# Patient Record
Sex: FEMALE | Race: WHITE | Employment: OTHER | URBAN - METROPOLITAN AREA
[De-identification: names, ages, dates, MRNs, and addresses within clinical notes are randomized per-mention and may not be internally consistent; named-entity substitution may affect disease eponyms.]

---

## 2017-02-15 ENCOUNTER — HOSPITAL ENCOUNTER (OUTPATIENT)
Dept: GENERAL RADIOLOGY | Age: 82
Discharge: HOME | End: 2017-02-15
Payer: MEDICARE

## 2017-02-15 ENCOUNTER — OFFICE VISIT (OUTPATIENT)
Dept: INTERNAL MEDICINE CLINIC | Age: 82
End: 2017-02-15

## 2017-02-15 VITALS
SYSTOLIC BLOOD PRESSURE: 138 MMHG | RESPIRATION RATE: 14 BRPM | BODY MASS INDEX: 24.96 KG/M2 | WEIGHT: 132.2 LBS | OXYGEN SATURATION: 97 % | DIASTOLIC BLOOD PRESSURE: 78 MMHG | TEMPERATURE: 99.2 F | HEIGHT: 61 IN | HEART RATE: 99 BPM

## 2017-02-15 DIAGNOSIS — R07.81 RIB PAIN ON RIGHT SIDE: ICD-10-CM

## 2017-02-15 DIAGNOSIS — R07.9 CHEST PAIN, UNSPECIFIED TYPE: ICD-10-CM

## 2017-02-15 DIAGNOSIS — R06.02 SOB (SHORTNESS OF BREATH) ON EXERTION: ICD-10-CM

## 2017-02-15 DIAGNOSIS — R07.81 RIB PAIN ON RIGHT SIDE: Primary | ICD-10-CM

## 2017-02-15 PROCEDURE — 71111 X-RAY EXAM RIBS/CHEST4/> VWS: CPT

## 2017-02-15 RX ORDER — ACETAMINOPHEN 500 MG
500 TABLET ORAL
Qty: 1 TAB | Refills: 0 | COMMUNITY
Start: 2017-02-15 | End: 2018-10-03

## 2017-02-15 NOTE — MR AVS SNAPSHOT
Visit Information Date & Time Provider Department Dept. Phone Encounter #  
 2/15/2017  2:00 PM Mikle Marking, [address] [phone] [id number] Follow-up Instructions Return for f/u in April as scheduled with Dr. Yuridia Lucas. Your Appointments 4/5/2017 12:40 PM  
ROUTINE CARE with MD Ludin [facility] ([address]) Appt Note: 6m fu for HTN  
 [address], [address] [place] [address]  
[address], [facility] [address] [phone] Upcoming Health Maintenance Date Due DTaP/Tdap/Td series (1 - Tdap) 12/18/1946 ZOSTER VACCINE AGE 60> 12/18/1985 INFLUENZA AGE 9 TO ADULT 8/1/2016 MEDICARE YEARLY EXAM 9/22/2016 GLAUCOMA SCREENING Q2Y 4/25/2018 Allergies as of 2/15/2017  Review Complete On: 2/15/2017 By: Americo Diamond LPN Severity Noted Reaction Type Reactions Morphine  11/09/2011    Nausea and Vomiting Did not relieve pain and couldn't sleep and caused vomiting Cortisone Low 12/31/2013   Side Effect Other (comments) Injection-she had trouble walking after injection - worsening of movement Current Immunizations  Reviewed on 9/22/2015 Name Date Influenza Vaccine 10/15/2014 Pneumococcal Conjugate (PCV-13) 9/22/2015 Pneumococcal Polysaccharide (PPSV-23) 6/17/2013 Not reviewed this visit You Were Diagnosed With   
  
 Codes Comments Rib pain on right side    -  Primary ICD-10-CM: R07.81 ICD-9-CM: 786.50 SOB (shortness of breath) on exertion     ICD-10-CM: R06.02 
ICD-9-CM: 786.05 Chest pain, unspecified type     ICD-10-CM: R07.9 ICD-9-CM: 786.50 Vitals BP Pulse Temp Resp Height(growth percentile) Weight(growth percentile) 138/78 (BP 1 Location: Left arm, BP Patient Position: Sitting) 99 99.2 °F (37.3 °C) (Oral) 14 5' 1\" (1.549 m) 132 lb 3.2 oz (60 kg) SpO2 BMI OB Status Smoking Status 97% 24.98 kg/m2 Hysterectomy Never Smoker Vitals History BMI and BSA Data Body Mass Index Body Surface Area 24.98 kg/m 2 1.61 m 2 Preferred Pharmacy Pharmacy Name Phone [facility], [address] [phone] Your Updated Medication List  
  
   
This list is accurate as of: 2/15/17  2:47 PM.  Always use your most recent med list.  
  
  
  
  
 levothyroxine 75 mcg tablet Commonly known as:  SYNTHROID Take 1 tablet 6 days per week. losartan 25 mg tablet Commonly known as:  COZAAR Take 1 Tab by mouth daily. Indications: HYPERTENSION  
  
 nabumetone 500 mg tablet Commonly known as:  RELAFEN Take 1 Tab by mouth two (2) times daily as needed for Pain. PRESERVISION AREDS 2 PO Take  by mouth. SYSTANE (PROPYLENE GLYCOL) 0.4-0.3 % Drop Generic drug:  peg 400-propylene glycol Administer  to left eye as needed. TYLENOL EXTRA STRENGTH 500 mg tablet Generic drug:  acetaminophen Take 1 Tab by mouth every eight (8) hours as needed for Pain. VITAMIN D3 1,000 unit tablet Generic drug:  cholecalciferol Take 2,000 Units by mouth daily. We Performed the Following AMB POC EKG ROUTINE W/ 12 LEADS, INTER & REP [91634 CPT(R)] Follow-up Instructions Return for f/u in April as scheduled with Dr. Ritchie Landrum. To-Do List   
 02/15/2017 Imaging:  XR RIBS BI W PA CHEST 4 VS   
  
  
Introducing [employer]! Thony Thomson introduces [employer] patient portal. Now you can access parts of your medical record, email your doctor's office, and request medication refills online. 1. In your internet browser, go to https://[email or web address]. Cmed/Ask The Doctort 2. Click on the First Time User? Click Here link in the Sign In box. You will see the New Member Sign Up page. 3. Enter your [employer] Access Code exactly as it appears below.  You will not need to use this code after youve completed the sign-up process. If you do not sign up before the expiration date, you must request a new code. · [employer] Access Code: [email or web address] Expires: 5/16/2017  1:37 PM 
 
4. Enter the last four digits of your Social Security Number (xxxx) and Date of Birth (mm/dd/yyyy) as indicated and click Submit. You will be taken to the next sign-up page. 5. Create a [employer] ID. This will be your [employer] login ID and cannot be changed, so think of one that is secure and easy to remember. 6. Create a [employer] password. You can change your password at any time. 7. Enter your Password Reset Question and Answer. This can be used at a later time if you forget your password. 8. Enter your e-mail address. You will receive e-mail notification when new information is available in [address]. 9. Click Sign Up. You can now view and download portions of your medical record. 10. Click the Download Summary menu link to download a portable copy of your medical information. If you have questions, please visit the Frequently Asked Questions section of the [employer] website. Remember, [employer] is NOT to be used for urgent needs. For medical emergencies, dial 911. Now available from your iPhone and Android! Please provide this summary of care documentation to your next provider. Your primary care clinician is listed as [address]. If you have any questions after today's visit, please call [phone].

## 2017-02-15 NOTE — PROGRESS NOTES
Paige Moore is a 80 y.o. female  Chief Complaint   Patient presents with    Fall     2 weeks ago, hit right side/ribs on upholster arm of chair     1. Have you been to the ER, urgent care clinic since your last visit? Hospitalized since your last visit? No    2. Have you seen or consulted any other health care providers outside of the [address] [place] [address] since your last visit? Include any pap smears or colon screening.   No

## 2017-02-15 NOTE — PROGRESS NOTES
Subjective:      Jenness Ormond is a 80 y.o. female who presents today for rib pain. R chest wall pain (anterior, lateral and posterior) started after she hit tripped and fell onto an upholstered chair. She did not hit her head. Pain worse with coughing, movement and with deep breathing. Pain is sharp. No bruising or skin changes. No wheezing. She has been taking four 81 mg ASA daily since her fall. Takes Nabumetone once a week for knee arthritis pain. She notes feeling sob on exertion intermittently over the last year (ie doing house work, vacuuming, etc). Occasional mid chest pain with and without exertion x 1 year also. She attributes sob to [facility] age\" and chest pain to \"indigestion. \" Intermittent reflux and \"sour taste\" in throat. Sob and chest pain have no impact on ADLs. Current Outpatient Prescriptions   Medication Sig Dispense Refill    VIT C/E/ZN/COPPR/LUTEIN/ZEAXAN (PRESERVISION AREDS 2 PO) Take  by mouth.  peg 400-propylene glycol (SYSTANE, PROPYLENE GLYCOL,) 0.4-0.3 % drop Administer  to left eye as needed.  acetaminophen (TYLENOL EXTRA STRENGTH) 500 mg tablet Take 1 Tab by mouth every eight (8) hours as needed for Pain. 1 Tab 0    nabumetone (RELAFEN) 500 mg tablet Take 1 Tab by mouth two (2) times daily as needed for Pain. 60 Tab 0    cholecalciferol (VITAMIN D3) 1,000 unit tablet Take 2,000 Units by mouth daily.  levothyroxine (SYNTHROID) 75 mcg tablet Take 1 tablet 6 days per week. 90 Tab 1    losartan (COZAAR) 25 mg tablet Take 1 Tab by mouth daily.  Indications: HYPERTENSION 90 Tab 3     Allergies   Allergen Reactions    Morphine Nausea and Vomiting     Did not relieve pain and couldn't sleep and caused vomiting      Cortisone Other (comments)     Injection-she had trouble walking after injection - worsening of movement     Past Medical History   Diagnosis Date    Hypercholesterolemia     Hypertension     Thyroid disease      hypothyroid        Review of Systems    Pertinent items are noted in HPI. Objective:     Visit Vitals    /78 (BP 1 Location: Left arm, BP Patient Position: Sitting)    Pulse 99    Temp 99.2 °F (37.3 °C) (Oral)    Resp 14    Ht 5' 1\" (1.549 m)    Wt 132 lb 3.2 oz (60 kg)    SpO2 97%    BMI 24.98 kg/m2     General appearance: alert, cooperative, no distress, appears stated age, very pleasant white elderly female  Lungs: clear to auscultation bilaterally  Heart: regular rate and rhythm, S1, S2 normal, no murmur, click, rub or gallop  Chest wall: R lower anterior, lateral and posterior rib pain  Neuro: A&Ox3, CN II-XII intact. Gait and coordination normal.    Assessment/Plan:   Rib pain on right side - check rib series to r/o fx. Tylenol 500 mg or 650 mg q8h prn. Avoid nsaids. Cold/warm compresses. Realistic expectations. -     XR RIBS BI W PA CHEST 4 VS; Future    SOB (shortness of breath) on exertion - unchanged x 1 year. Check CXR. Reassured. -     XR RIBS BI W PA CHEST 4 VS; Future  -     AMB POC EKG ROUTINE W/ 12 LEADS, INTER & REP    Chest pain, unspecified type - atypical. EKG wnl. ?GERD. D/c ASA. -     XR RIBS BI W PA CHEST 4 VS; Future  -     AMB POC EKG ROUTINE W/ 12 LEADS, INTER & REP    Discussed plan with Dr. Herminio Lynn.    Follow-up Disposition:  Return for f/u in April as scheduled with Dr. Nahomy Duran. Advised her to call back or return to office if symptoms worsen/change/persist.  Discussed expected course/resolution/complications of diagnosis in detail with patient. Medication risks/benefits/costs/interactions/alternatives discussed with patient. She was given an after visit summary which includes diagnoses, current medications, & vitals. She expressed understanding with the diagnosis and plan.     Vinod Go PA-C

## 2017-03-02 RX ORDER — LEVOTHYROXINE SODIUM 75 UG/1
TABLET ORAL
Qty: 90 TAB | Refills: 1 | Status: SHIPPED | OUTPATIENT
Start: 2017-03-02 | End: 2017-10-16 | Stop reason: SDUPTHER

## 2017-03-02 NOTE — TELEPHONE ENCOUNTER
Last office visit - 02.15.17  Next office visit - 04.05.17      Requested Prescriptions     Pending Prescriptions Disp Refills    levothyroxine (SYNTHROID) 75 mcg tablet 90 Tab 1     Sig: Take 1 tablet 6 days per week.

## 2017-03-21 DIAGNOSIS — I10 BENIGN ESSENTIAL HYPERTENSION: ICD-10-CM

## 2017-03-21 RX ORDER — LOSARTAN POTASSIUM 25 MG/1
TABLET ORAL
Qty: 90 TAB | Refills: 0 | Status: SHIPPED | OUTPATIENT
Start: 2017-03-21 | End: 2017-06-15 | Stop reason: SDUPTHER

## 2017-04-05 ENCOUNTER — HOSPITAL ENCOUNTER (OUTPATIENT)
Dept: LAB | Age: 82
Discharge: HOME | End: 2017-04-05
Payer: MEDICARE

## 2017-04-05 ENCOUNTER — OFFICE VISIT (OUTPATIENT)
Dept: INTERNAL MEDICINE CLINIC | Age: 82
End: 2017-04-05

## 2017-04-05 VITALS
BODY MASS INDEX: 25.68 KG/M2 | WEIGHT: 136 LBS | DIASTOLIC BLOOD PRESSURE: 70 MMHG | OXYGEN SATURATION: 98 % | HEIGHT: 61 IN | TEMPERATURE: 98.4 F | SYSTOLIC BLOOD PRESSURE: 110 MMHG | HEART RATE: 97 BPM | RESPIRATION RATE: 18 BRPM

## 2017-04-05 DIAGNOSIS — E55.9 VITAMIN D DEFICIENCY: ICD-10-CM

## 2017-04-05 DIAGNOSIS — M17.0 ARTHRITIS OF BOTH KNEES: Primary | ICD-10-CM

## 2017-04-05 DIAGNOSIS — E03.9 ACQUIRED HYPOTHYROIDISM: ICD-10-CM

## 2017-04-05 DIAGNOSIS — I10 BENIGN ESSENTIAL HYPERTENSION: ICD-10-CM

## 2017-04-05 DIAGNOSIS — Z00.00 MEDICARE ANNUAL WELLNESS VISIT, SUBSEQUENT: ICD-10-CM

## 2017-04-05 PROCEDURE — 36415 COLL VENOUS BLD VENIPUNCTURE: CPT

## 2017-04-05 PROCEDURE — 80048 BASIC METABOLIC PNL TOTAL CA: CPT

## 2017-04-05 PROCEDURE — 85027 COMPLETE CBC AUTOMATED: CPT

## 2017-04-05 PROCEDURE — 82306 VITAMIN D 25 HYDROXY: CPT

## 2017-04-05 PROCEDURE — 84443 ASSAY THYROID STIM HORMONE: CPT

## 2017-04-05 NOTE — PROGRESS NOTES
HPI:  Harry Main is a 80y.o. year old female who returns to clinic today for routine follow up appointment to discuss the issues below:    Lives independently at [facility]. She feels generally well. 1.  Arthritis in both knees, right > left and in hands. Takes nabumetone on a prn basis only (and tends to wait until pain is severe). 2.  HTN. Adherent to current regimen as prescribed. No issues. 3.  Hypothyrodism. Adherent to levothyroxine 6 days per week on empty stomach. Last TSH wnl 10/2016.    4.  Vit D - inc to 2000 units after low level in Oct.     Prior to Admission medications    Medication Sig Start Date End Date Taking? Authorizing Provider   losartan (COZAAR) 25 mg tablet TAKE ONE TABLET EVERY DAY 3/21/17  Yes Trisha Weiner MD   levothyroxine (SYNTHROID) 75 mcg tablet Take 1 tablet 6 days per week. 3/2/17  Yes Trisha Weiner MD   VIT C/E/ZN/COPPR/LUTEIN/ZEAXAN (PRESERVISION AREDS 2 PO) Take  by mouth. Yes Historical Provider   peg 400-propylene glycol (SYSTANE, PROPYLENE GLYCOL,) 0.4-0.3 % drop Administer  to left eye as needed. Yes Historical Provider   acetaminophen (TYLENOL EXTRA STRENGTH) 500 mg tablet Take 1 Tab by mouth every eight (8) hours as needed for Pain. 2/15/17  Yes GABBY Salas   nabumetone (RELAFEN) 500 mg tablet Take 1 Tab by mouth two (2) times daily as needed for Pain. 11/10/16  Yes Trisha Weiner MD   cholecalciferol (VITAMIN D3) 1,000 unit tablet Take 2,000 Units by mouth daily. Yes Historical Provider          Allergies   Allergen Reactions    Morphine Nausea and Vomiting     Did not relieve pain and couldn't sleep and caused vomiting      Cortisone Other (comments)     Injection-she had trouble walking after injection - worsening of movement           Review of Systems   Constitutional: Negative for chills, fever and malaise/fatigue. HENT: Negative for congestion. Respiratory: Negative for cough, shortness of breath and wheezing. Cardiovascular: Negative for chest pain, palpitations and leg swelling. Gastrointestinal: Positive for heartburn (intermittently). Negative for abdominal pain and blood in stool. Musculoskeletal: Negative for falls, joint pain and myalgias. Neurological: Negative for dizziness and headaches. Physical Exam   Constitutional: She appears well-nourished. Neck: Carotid bruit is not present. Cardiovascular: Normal rate, regular rhythm and normal heart sounds. No murmur heard. Pulses:       Carotid pulses are 2+ on the right side, and 2+ on the left side. Pulmonary/Chest: Effort normal and breath sounds normal.   Abdominal: Soft. Bowel sounds are normal. There is no hepatosplenomegaly. There is no tenderness. Musculoskeletal: She exhibits no edema. Visit Vitals    /70 (BP 1 Location: Left arm, BP Patient Position: Sitting)    Pulse 97    Temp 98.4 °F (36.9 °C) (Oral)    Resp 18    Ht 5' 1\" (1.549 m)    Wt 136 lb (61.7 kg)    SpO2 98%    BMI 25.7 kg/m2         Assessment & Plan:  Katia Aburto was seen today for hypertension and annual wellness visit. Diagnoses and all orders for this visit:    Arthritis of both knees  Discussed recommend use of extra strength tylenol as first line therapy with topical lidocaine to knees. Use nabumetone as breakthrough agent. Benign essential hypertension  I evaluated and recommended to continue current doses of medications pending lab work. -     CBC W/O DIFF  -     METABOLIC PANEL, BASIC    Acquired hypothyroidism  Clinically euthyroid but HR upper end normal today. Recheck TSH.   -     TSH REFLEX TO T4    Vitamin D deficiency  -     VITAMIN D, 25 HYDROXY    Also, she has additional complaints of - seen for medicare wellness.    Reviewed documentation with Emeterio Boswell RN    Medicare annual wellness visit, subsequent      Follow-up Disposition:  Return in about 6 months (around 10/5/2017) for Establish care with new provider for Hypertension follow up. Advised her to call back or return to office if symptoms worsen/change/persist.  Discussed expected course/resolution/complications of diagnosis in detail with patient. Medication risks/benefits/costs/interactions/alternatives discussed with patient. She was given an after visit summary which includes diagnoses, current medications, & vitals. She expressed understanding with the diagnosis and plan.

## 2017-04-05 NOTE — PROGRESS NOTES
Chief Complaint   Patient presents with    Hypertension     1. Have you been to the ER, urgent care clinic since your last visit? No  Hospitalized since your last visit? No    2. Have you seen or consulted any other health care providers outside of the [address] [place] [address] since your last visit? No  Include any pap smears or colon screening.  No

## 2017-04-05 NOTE — PATIENT INSTRUCTIONS
Odor Free Aspercreme (topical Lidocaine)    Schedule of Personalized Health Plan  (Provide Copy to Patient)  The best way to stay healthy is to live a healthy lifestyle. A healthy lifestyle includes regular exercise, eating a well-balanced diet, keeping a healthy weight and not smoking. Regular physical exams and screening tests are another important way to take care of yourself. Preventive exams provided by health care providers can find health problems early when treatment works best and can keep you from getting certain diseases or illnesses. Preventive services include exams, lab tests, screenings, shots, monitoring and information to help you take care of your own health. All people over 65 should have a pneumonia shot. Pneumonia shots are usually only needed once in a lifetime unless your doctor decides differently. (received pneumovax 23 6/17/13; Prevnar 13 (9/22/15)    All people over 65 should have a yearly flu shot. (IVNA Oct 2016)    People over 65 are at medium to high risk for Hepatitis B. Three shots are needed for complete protection. In addition to your physical exam, some screening tests are recommended:    Bone mass measurement (dexa scan) is recommended every two years (4/21/16 completed)  Diabetes Mellitus screening is recommended every year. (ordered today)    Glaucoma is an eye disease caused by high pressure in the eye. An eye exam is recommended every year. [facility]; has appt scheduled)    Cardiovascular screening tests that check your cholesterol and other blood fat (lipid) levels are recommended every five years. Colorectal Cancer screening tests help to find pre-cancerous polyps (growths in the colon) so they can be removed before they turn into cancer. Tests ordered for screening depend on your personal and family history risk factors.      Screening for Breast Cancer is recommended yearly with a mammogram. ([place]; to schedule)    Screening for Cervical Cancer is recommended every two years (annually for certain risk factors, such as previous history of STD or abnormal PAP in past 7 years), with a Pelvic Exam with PAP (Dr. Conor Baldwin; will schedule)    Here is a list of your current Health Maintenance items with a due date:  Health Maintenance   Topic Date Due    DTaP/Tdap/Td series (1 - Tdap) 12/18/1946    ZOSTER VACCINE AGE 60>  12/18/1985    INFLUENZA AGE 9 TO ADULT  08/01/2016    MEDICARE YEARLY EXAM  04/06/2018    GLAUCOMA SCREENING Q2Y  04/25/2018    OSTEOPOROSIS SCREENING (DEXA)  Completed    Pneumococcal 65+ Low/Medium Risk  Completed

## 2017-04-05 NOTE — PROGRESS NOTES
Army Collier is a 80 y.o. female and presents for annual Medicare Wellness Visit. Problem List: Reviewed with patient and discussed risk factors. Patient Active Problem List   Diagnosis Code    Colon polyp K63.5    Elevated cholesterol E78.00    Benign essential hypertension I10    Acquired hypothyroidism E03.9    Cataract H26.9    Arthritis of both knees M17.0    TB (tuberculosis), treated A15.9    H/O bone density study Z92.89    Episodic lightheadedness R42    Vitamin D deficiency E55.9       Current medical providers:  Patient Care Team:  Lucia Iraheta MD as PCP - General (Internal Medicine)  Brent Lamb MD as Physician (Orthopedic Surgery)  Roseanne Feliciano MD as Physician (Ophthalmology)    [address] [place] [address] Yanely: Reviewed with patient  Past Surgical History:   Procedure Laterality Date    ENDOSCOPY, COLON, DIAGNOSTIC  2001    HX BACK SURGERY  1984    HX BREAST BIOPSY  2010    duct removal    HX DILATION AND CURETTAGE      HX HYSTERECTOMY  1980    +BSO    HX OPEN REDUCTION INTERNAL FIXATION  1996    L forearm    VASCULAR SURGERY PROCEDURE UNLIST  1960s    vein stripping-bilat        SH: Reviewed with patient  Social History   Substance Use Topics    Smoking status: Never Smoker    Smokeless tobacco: Never Used    Alcohol use No       FH: Reviewed with patient  Family History   Problem Relation Age of Onset    Cancer Father      liver    Colon Cancer Mother     Cancer Sister      liver    Cancer Brother      liver    Colon Polyps Sister     Thyroid Cancer Daughter        Medications/Allergies: Reviewed with patient  Current Outpatient Prescriptions on File Prior to Visit   Medication Sig Dispense Refill    losartan (COZAAR) 25 mg tablet TAKE ONE TABLET EVERY DAY 90 Tab 0    levothyroxine (SYNTHROID) 75 mcg tablet Take 1 tablet 6 days per week. 90 Tab 1    VIT C/E/ZN/COPPR/LUTEIN/ZEAXAN (PRESERVISION AREDS 2 PO) Take  by mouth.       peg 400-propylene glycol (SYSTANE, PROPYLENE GLYCOL,) 0.4-0.3 % drop Administer  to left eye as needed.  acetaminophen (TYLENOL EXTRA STRENGTH) 500 mg tablet Take 1 Tab by mouth every eight (8) hours as needed for Pain. 1 Tab 0    nabumetone (RELAFEN) 500 mg tablet Take 1 Tab by mouth two (2) times daily as needed for Pain. 60 Tab 0    cholecalciferol (VITAMIN D3) 1,000 unit tablet Take 2,000 Units by mouth daily. No current facility-administered medications on file prior to visit. Allergies   Allergen Reactions    Morphine Nausea and Vomiting     Did not relieve pain and couldn't sleep and caused vomiting      Cortisone Other (comments)     Injection-she had trouble walking after injection - worsening of movement       Objective:  Visit Vitals    /70 (BP 1 Location: Left arm, BP Patient Position: Sitting)    Pulse 97    Temp 98.4 °F (36.9 °C) (Oral)    Resp 18    Ht 5' 1\" (1.549 m)    Wt 136 lb (61.7 kg)    SpO2 98%    BMI 25.7 kg/m2    Body mass index is 25.7 kg/(m^2). Assessment of cognitive impairment: Alert and oriented x 3    Depression Screen:   PHQ 2 / 9, over the last two weeks 4/5/2017   Little interest or pleasure in doing things Not at all   Feeling down, depressed or hopeless Not at all   Total Score PHQ 2 0       Fall Risk Assessment:    Fall Risk Assessment, last 12 mths 4/5/2017   Able to walk? Yes   Fall in past 12 months? Yes   Fall with injury? Yes   Number of falls in past 12 months 1   Fall Risk Score 2       Functional Ability:   Does the patient exhibit a steady gait? yes   How long did it take the patient to get up and walk from a sitting position? 4 seconds   Is the patient self reliant?  (ie can do own laundry, meals, household chores)  yes     Does the patient handle his/her own medications? yes     Does the patient handle his/her own money? yes     Is the patients home safe (ie good lighting, handrails on stairs and bath, etc.)?    yes     Did you notice or did patient express any hearing difficulties? no     Did you notice or did patient express any vision difficulties?   no     Were distance and reading eye charts used? no       Advance Care Planning:   Patient was offered the opportunity to discuss advance care planning:  yes     Does patient have an Advance Directive:  yes   If no, did you provide information on Caring Connections?  no       Plan:      Orders Placed This Encounter    VITAMIN D, 25 HYDROXY    CBC W/O DIFF    METABOLIC PANEL, BASIC    TSH REFLEX TO T4       Health Maintenance   Topic Date Due    DTaP/Tdap/Td series (1 - Tdap) 12/18/1946    ZOSTER VACCINE AGE 60>  12/18/1985    INFLUENZA AGE 9 TO ADULT  08/01/2016    MEDICARE YEARLY EXAM  04/06/2018    GLAUCOMA SCREENING Q2Y  04/25/2018    OSTEOPOROSIS SCREENING (DEXA)  Completed    Pneumococcal 65+ Low/Medium Risk  Completed       *Patient verbalized understanding and agreement with the plan. A copy of the After Visit Summary with personalized health plan was given to the patient today. Patient here for a subsequent medicare wellness visit. Mrs Graham May lives at [facility] independent living. She is independent in all ADL's. Continues to drive. Participates in exercise at IP (line dancing and exercise). UTD on flu (IVNA), pneumovax 23 and Prevnar 13. Declined TDAP. Schedules mammogram at [address] [place] [address] 4/21/16- wn  Eye exams at [facility]; attends yearly. Patient has an advanced medical directive. Encouraged she provide a copy for the chart.

## 2017-04-05 NOTE — MR AVS SNAPSHOT
Visit Information Date & Time Provider Department Dept. Phone Encounter #  
 4/5/2017 12:40 PM [id number] MD Ludin [facility] [phone] Follow-up Instructions Return in about 6 months (around 10/5/2017) for Establish care with new provider for Hypertension follow up. Upcoming Health Maintenance Date Due DTaP/Tdap/Td series (1 - Tdap) 12/18/1946 ZOSTER VACCINE AGE 60> 12/18/1985 INFLUENZA AGE 9 TO ADULT 8/1/2016 MEDICARE YEARLY EXAM 4/6/2018 GLAUCOMA SCREENING Q2Y 4/25/2018 Allergies as of 4/5/2017  Review Complete On: 4/5/2017 By: Ana Falls Severity Noted Reaction Type Reactions Morphine  11/09/2011    Nausea and Vomiting Did not relieve pain and couldn't sleep and caused vomiting Cortisone Low 12/31/2013   Side Effect Other (comments) Injection-she had trouble walking after injection - worsening of movement Current Immunizations  Reviewed on 9/22/2015 Name Date Influenza Vaccine 10/15/2014 Pneumococcal Conjugate (PCV-13) 9/22/2015 Pneumococcal Polysaccharide (PPSV-23) 6/17/2013 Not reviewed this visit You Were Diagnosed With   
  
 Codes Comments Arthritis of both knees    -  Primary ICD-10-CM: M17.0 ICD-9-CM: 716.96 Benign essential hypertension     ICD-10-CM: I10 
ICD-9-CM: 401.1 Acquired hypothyroidism     ICD-10-CM: E03.9 ICD-9-CM: 917. 9 Vitamin D deficiency     ICD-10-CM: E55.9 ICD-9-CM: 268.9 Vitals BP Pulse Temp Resp Height(growth percentile) Weight(growth percentile) 110/70 (BP 1 Location: Left arm, BP Patient Position: Sitting) 97 98.4 °F (36.9 °C) (Oral) 18 5' 1\" (1.549 m) 136 lb (61.7 kg) SpO2 BMI OB Status Smoking Status 98% 25.7 kg/m2 Hysterectomy Never Smoker BMI and BSA Data Body Mass Index Body Surface Area 25.7 kg/m 2 1.63 m 2 Preferred Pharmacy Pharmacy Name Phone [facility] [address] [facility] [phone] Your Updated Medication List  
  
   
This list is accurate as of: 4/5/17  1:28 PM.  Always use your most recent med list.  
  
  
  
  
 levothyroxine 75 mcg tablet Commonly known as:  SYNTHROID Take 1 tablet 6 days per week. losartan 25 mg tablet Commonly known as:  COZAAR  
TAKE ONE TABLET EVERY DAY  
  
 nabumetone 500 mg tablet Commonly known as:  RELAFEN Take 1 Tab by mouth two (2) times daily as needed for Pain. PRESERVISION AREDS 2 PO Take  by mouth. SYSTANE (PROPYLENE GLYCOL) 0.4-0.3 % Drop Generic drug:  peg 400-propylene glycol Administer  to left eye as needed. TYLENOL EXTRA STRENGTH 500 mg tablet Generic drug:  acetaminophen Take 1 Tab by mouth every eight (8) hours as needed for Pain. VITAMIN D3 1,000 unit tablet Generic drug:  cholecalciferol Take 2,000 Units by mouth daily. We Performed the Following CBC W/O DIFF [13274 CPT(R)] METABOLIC PANEL, BASIC [00638 CPT(R)] TSH REFLEX TO T4 [[id number] Custom] VITAMIN D, 25 HYDROXY [id number] CPT(R)] Follow-up Instructions Return in about 6 months (around 10/5/2017) for Establish care with new provider for Hypertension follow up. To-Do List   
 04/05/2017 2:20 PM  
  Appointment with Stephenie Patel at [facility] ([phone]) Patient Instructions Odor Free Aspercreme (topical Lidocaine) Schedule of Personalized Health Plan (Provide Copy to Patient) The best way to stay healthy is to live a healthy lifestyle. A healthy lifestyle includes regular exercise, eating a well-balanced diet, keeping a healthy weight and not smoking. Regular physical exams and screening tests are another important way to take care of yourself.  Preventive exams provided by health care providers can find health problems early when treatment works best and can keep you from getting certain diseases or illnesses. Preventive services include exams, lab tests, screenings, shots, monitoring and information to help you take care of your own health. All people over 65 should have a pneumonia shot. Pneumonia shots are usually only needed once in a lifetime unless your doctor decides differently. (received pneumovax 23 6/17/13; Prevnar 13 (9/22/15) All people over 65 should have a yearly flu shot. [facility] Oct 2016) People over 65 are at medium to high risk for Hepatitis B. Three shots are needed for complete protection. In addition to your physical exam, some screening tests are recommended: 
 
Bone mass measurement (dexa scan) is recommended every two years (4/21/16 completed) Diabetes Mellitus screening is recommended every year. (ordered today) Glaucoma is an eye disease caused by high pressure in the eye. An eye exam is recommended every year. [facility]; has appt scheduled) Cardiovascular screening tests that check your cholesterol and other blood fat (lipid) levels are recommended every five years. Colorectal Cancer screening tests help to find pre-cancerous polyps (growths in the colon) so they can be removed before they turn into cancer. Tests ordered for screening depend on your personal and family history risk factors. Screening for Breast Cancer is recommended yearly with a mammogram. ([place]; to schedule) Screening for Cervical Cancer is recommended every two years (annually for certain risk factors, such as previous history of STD or abnormal PAP in past 7 years), with a Pelvic Exam with PAP (Dr. Bran Isaacs; will schedule) Here is a list of your current Health Maintenance items with a due date: 
Health Maintenance Topic Date Due  
 DTaP/Tdap/Td series (1 - Tdap) 12/18/1946  ZOSTER VACCINE AGE 60>  12/18/1985  INFLUENZA AGE 9 TO ADULT  08/01/2016  MEDICARE YEARLY EXAM  04/06/2018  GLAUCOMA SCREENING Q2Y  04/25/2018  OSTEOPOROSIS SCREENING (DEXA)  Completed  Pneumococcal 65+ Low/Medium Risk  Completed Introducing [facility] & HEALTH SERVICES! [employer] introduces Authentic8 patient portal. Now you can access parts of your medical record, email your doctor's office, and request medication refills online. 1. In your internet browser, go to [email or web address] 2. Click on the First Time User? Click Here link in the Sign In box. You will see the New Member Sign Up page. 3. Enter your Authentic8 Access Code exactly as it appears below. You will not need to use this code after youve completed the sign-up process. If you do not sign up before the expiration date, you must request a new code. · Authentic8 Access Code: [email or web address] Expires: 5/16/2017  2:37 PM 
 
4. Enter the last four digits of your Social Security Number (xxxx) and Date of Birth (mm/dd/yyyy) as indicated and click Submit. You will be taken to the next sign-up page. 5. Create a Authentic8 ID. This will be your Authentic8 login ID and cannot be changed, so think of one that is secure and easy to remember. 6. Create a Authentic8 password. You can change your password at any time. 7. Enter your Password Reset Question and Answer. This can be used at a later time if you forget your password. 8. Enter your e-mail address. You will receive e-mail notification when new information is available in [address] 9. Click Sign Up. You can now view and download portions of your medical record. 10. Click the Download Summary menu link to download a portable copy of your medical information. If you have questions, please visit the Frequently Asked Questions section of the Authentic8 website. Remember, Authentic8 is NOT to be used for urgent needs. For medical emergencies, dial 911. Now available from your iPhone and Android! Please provide this summary of care documentation to your next provider. Your primary care clinician is listed as [facility]. If you have any questions after today's visit, please call [phone].

## 2017-04-06 LAB
25(OH)D3+25(OH)D2 SERPL-MCNC: 33.3 NG/ML (ref 30–100)
BUN SERPL-MCNC: 23 MG/DL (ref 10–36)
BUN/CREAT SERPL: 37 (ref 12–28)
CALCIUM SERPL-MCNC: 8.9 MG/DL (ref 8.7–10.3)
CHLORIDE SERPL-SCNC: 101 MMOL/L (ref 96–106)
CO2 SERPL-SCNC: 24 MMOL/L (ref 18–29)
CREAT SERPL-MCNC: 0.63 MG/DL (ref 0.57–1)
ERYTHROCYTE [DISTWIDTH] IN BLOOD BY AUTOMATED COUNT: 14.9 % (ref 12.3–15.4)
GLUCOSE SERPL-MCNC: 86 MG/DL (ref 65–99)
HCT VFR BLD AUTO: 36.3 % (ref 34–46.6)
HGB BLD-MCNC: 12 G/DL (ref 11.1–15.9)
MCH RBC QN AUTO: 29.3 PG (ref 26.6–33)
MCHC RBC AUTO-ENTMCNC: 33.1 G/DL (ref 31.5–35.7)
MCV RBC AUTO: 89 FL (ref 79–97)
PLATELET # BLD AUTO: 194 X10E3/UL (ref 150–379)
POTASSIUM SERPL-SCNC: 4.3 MMOL/L (ref 3.5–5.2)
RBC # BLD AUTO: 4.09 X10E6/UL (ref 3.77–5.28)
SODIUM SERPL-SCNC: 141 MMOL/L (ref 134–144)
TSH SERPL DL<=0.005 MIU/L-ACNC: 1.78 UIU/ML (ref 0.45–4.5)
WBC # BLD AUTO: 6.8 X10E3/UL (ref 3.4–10.8)

## 2017-04-20 ENCOUNTER — TELEPHONE (OUTPATIENT)
Dept: INTERNAL MEDICINE CLINIC | Age: 82
End: 2017-04-20

## 2017-04-20 NOTE — TELEPHONE ENCOUNTER
Call to pt - she states that she has been taking Mucinex-DM for the last 3 days without improvement. Advised that she stop the DM and follow the information listed below:    1. Mucinex (Guaifenesen) plain-Blue Box 600 mg. Take BID with full glass water for 10 days     2. Saline Nasal Spray - use liberally to flush post-nasal area; use as many times a day as desired. Keep spraying with head tilted back until you feel need to swallow     3. Drink lots of fluids (mainly water) to keep mucus thinner     4. Long acting antihistamine (Claritin/Loratadine, Allegra/Fexofenadine or Zyrtec/Ceterizine) is useful if allergy symptoms are also present    Pt was advised that if her symptoms do not improve in the next 5-7 days or if a fever presents after the third day to contact the office. Ms.Noemy acknowledged understanding and all questions were answered to patients satisfaction. No further questions or concerns at this time.

## 2017-04-20 NOTE — TELEPHONE ENCOUNTER
Joaquin Patel 12/18/1925     Pt called stating that for a couple days she had sneezing and then it turned into a runny nose. Pt states that now she has thick mucus that is yellow and green. Pt would like to know if there is a OTC medication that she can  or can something be called in to the pharmacy.

## 2017-04-21 NOTE — TELEPHONE ENCOUNTER
Patient called stating that she had a fever of 100. 1. She wants to know if an antibiotic could be called into her pharmacy or if there is another suggestion. Please call her back at [phone]

## 2017-04-21 NOTE — TELEPHONE ENCOUNTER
Spoke to patient who reports no improvement since yesterday. Symptoms are pretty much the same. I advised patient that our office unfortunately doesn't have any availability to get her seen in the office today. Patient states that she can wait until Monday if need be. She would like to see a provider she is familiar with. I advised her that if fever persist she needs to go to the nearest urgent care facility. Patient scheduled with Anna Marie Cabrera on Monday for further evaluation.

## 2017-04-24 ENCOUNTER — OFFICE VISIT (OUTPATIENT)
Dept: INTERNAL MEDICINE CLINIC | Age: 82
End: 2017-04-24

## 2017-04-24 VITALS
DIASTOLIC BLOOD PRESSURE: 72 MMHG | BODY MASS INDEX: 24.62 KG/M2 | SYSTOLIC BLOOD PRESSURE: 144 MMHG | RESPIRATION RATE: 16 BRPM | HEIGHT: 61 IN | HEART RATE: 69 BPM | TEMPERATURE: 96.9 F | OXYGEN SATURATION: 98 % | WEIGHT: 130.4 LBS

## 2017-04-24 DIAGNOSIS — R05.9 COUGH: ICD-10-CM

## 2017-04-24 DIAGNOSIS — J06.9 UPPER RESPIRATORY TRACT INFECTION, UNSPECIFIED TYPE: Primary | ICD-10-CM

## 2017-04-24 RX ORDER — AZITHROMYCIN 250 MG/1
250 TABLET, FILM COATED ORAL SEE ADMIN INSTRUCTIONS
Qty: 6 TAB | Refills: 0 | Status: SHIPPED | OUTPATIENT
Start: 2017-04-24 | End: 2017-04-29

## 2017-04-24 NOTE — MR AVS SNAPSHOT
Visit Information Date & Time Provider Department Dept. Phone Encounter #  
 4/24/2017 11:00 AM Socorro Guzman NP [place] [address] [facility] [phone] Upcoming Health Maintenance Date Due ZOSTER VACCINE AGE 60> 12/18/1985 INFLUENZA AGE 9 TO ADULT 8/1/2016 MEDICARE YEARLY EXAM 4/6/2018 GLAUCOMA SCREENING Q2Y 4/25/2018 DTaP/Tdap/Td series (2 - Td) 4/5/2027 Allergies as of 4/24/2017  Review Complete On: 4/24/2017 By: Socorro Guzman NP Severity Noted Reaction Type Reactions Morphine  11/09/2011    Nausea and Vomiting Did not relieve pain and couldn't sleep and caused vomiting Cortisone Low 12/31/2013   Side Effect Other (comments) Injection-she had trouble walking after injection - worsening of movement Current Immunizations  Reviewed on 9/22/2015 Name Date Influenza Vaccine 10/15/2014 Pneumococcal Conjugate (PCV-13) 9/22/2015 Pneumococcal Polysaccharide (PPSV-23) 6/17/2013 Not reviewed this visit You Were Diagnosed With   
  
 Codes Comments Upper respiratory tract infection, unspecified type    -  Primary ICD-10-CM: J06.9 ICD-9-CM: 465.9 Cough     ICD-10-CM: R05 ICD-9-CM: 204. 2 Vitals BP Pulse Temp Resp Height(growth percentile) Weight(growth percentile) 144/72 (BP 1 Location: Left arm, BP Patient Position: Sitting) 69 96.9 °F (36.1 °C) (Oral) 16 5' 1\" (1.549 m) 130 lb 6.4 oz (59.1 kg) SpO2 BMI OB Status Smoking Status 98% 24.64 kg/m2 Hysterectomy Never Smoker Vitals History BMI and BSA Data Body Mass Index Body Surface Area  
 24.64 kg/m 2 1.59 m 2 Preferred Pharmacy Pharmacy Name Phone [facility], [address] [facility] [phone] Your Updated Medication List  
  
   
This list is accurate as of: 4/24/17 11:39 AM.  Always use your most recent med list.  
  
  
  
  
 azithromycin 250 mg tablet Commonly known as:  Lulú Po Take 1 Tab by mouth See Admin Instructions for 5 days. levothyroxine 75 mcg tablet Commonly known as:  SYNTHROID Take 1 tablet 6 days per week. losartan 25 mg tablet Commonly known as:  COZAAR  
TAKE ONE TABLET EVERY DAY  
  
 nabumetone 500 mg tablet Commonly known as:  RELAFEN Take 1 Tab by mouth two (2) times daily as needed for Pain. PRESERVISION AREDS 2 PO Take  by mouth. SYSTANE (PROPYLENE GLYCOL) 0.4-0.3 % Drop Generic drug:  peg 400-propylene glycol Administer  to left eye as needed. TYLENOL EXTRA STRENGTH 500 mg tablet Generic drug:  acetaminophen Take 1 Tab by mouth every eight (8) hours as needed for Pain. VITAMIN D3 1,000 unit tablet Generic drug:  cholecalciferol Take 2,000 Units by mouth daily. Prescriptions Sent to Pharmacy Refills  
 azithromycin (ZITHROMAX) 250 mg tablet 0 Sig: Take 1 Tab by mouth See Admin Instructions for 5 days. Class: Normal  
 Pharmacy: [facility], [address] [facility] #: [phone] Route: Oral  
  
Patient Instructions Delsym cough syrup as needed every 12 hours. Drink enough fluids to keep your urine more clear than yellow Continue the Zyrtec, Mucinex and saline nasal spray Introducing [facility] & HEALTH SERVICES! Theron Toussaint introduces Sociogramics patient portal. Now you can access parts of your medical record, email your doctor's office, and request medication refills online. 1. In your internet browser, go to [email or web address] 2. Click on the First Time User? Click Here link in the Sign In box. You will see the New Member Sign Up page. 3. Enter your Sociogramics Access Code exactly as it appears below. You will not need to use this code after youve completed the sign-up process. If you do not sign up before the expiration date, you must request a new code.  
 
· Sociogramics Access Code: [email or web address] 
 Expires: 5/16/2017  2:37 PM 
 
4. Enter the last four digits of your Social Security Number (xxxx) and Date of Birth (mm/dd/yyyy) as indicated and click Submit. You will be taken to the next sign-up page. 5. Create a UTILICASE ID. This will be your UTILICASE login ID and cannot be changed, so think of one that is secure and easy to remember. 6. Create a UTILICASE password. You can change your password at any time. 7. Enter your Password Reset Question and Answer. This can be used at a later time if you forget your password. 8. Enter your e-mail address. You will receive e-mail notification when new information is available in [address] 9. Click Sign Up. You can now view and download portions of your medical record. 10. Click the Download Summary menu link to download a portable copy of your medical information. If you have questions, please visit the Frequently Asked Questions section of the UTILICASE website. Remember, UTILICASE is NOT to be used for urgent needs. For medical emergencies, dial 911. Now available from your iPhone and Android! Please provide this summary of care documentation to your next provider. Your primary care clinician is listed as Dalia Tracy. If you have any questions after today's visit, please call [phone].

## 2017-04-24 NOTE — PROGRESS NOTES
79 yo female reports 10 days of sneezing, and now cough w/ colored mucus production. She has been taking Mucinex and Zyrtec and using Saline NS w/o much help. She has been trying to hydrate when not sleeping. She has had pneumonia several times - last about 4-5 years ago. PE: WNWD WF   T = 96.9   Phar - nl   TMs - slightly dull   Neck - no nodes   Lungs - crackles L base (also heard by me at 4/2014 visit w/ XR showing increased interstitial markings), otherwise, lungs - clear    Imp: URI for 10 days now with cough    Plan: Z-pack   Continue Mucinex, Zyrtec and Saline NS   Delsym   Hydration  ______________________________  Expected course of current diagnosed problem(s) as well as expected progression and possible complications, and desired follow up with provider are discussed with patient. Patient is encouraged to be back in touch with any questions or concerns. Patient expresses understanding of plan of care. Patient is given AVS which includes diagnoses, current medications, vitals.

## 2017-04-24 NOTE — PATIENT INSTRUCTIONS
Delsym cough syrup as needed every 12 hours.     Drink enough fluids to keep your urine more clear than yellow    Continue the Zyrtec, Mucinex and saline nasal spray

## 2017-04-24 NOTE — PROGRESS NOTES
Chief Complaint   Patient presents with    Cold Symptoms     pt c/o excessive mucus & cough that started last friday. She states she is taking Zyrtec, nasal saline & mucinex without improvements.

## 2017-06-15 DIAGNOSIS — I10 BENIGN ESSENTIAL HYPERTENSION: ICD-10-CM

## 2017-06-15 RX ORDER — LOSARTAN POTASSIUM 25 MG/1
TABLET ORAL
Qty: 90 TAB | Refills: 0 | Status: SHIPPED | OUTPATIENT
Start: 2017-06-15 | End: 2017-06-20 | Stop reason: SDUPTHER

## 2017-10-16 ENCOUNTER — OFFICE VISIT (OUTPATIENT)
Dept: INTERNAL MEDICINE CLINIC | Age: 82
End: 2017-10-16

## 2017-10-16 VITALS
RESPIRATION RATE: 18 BRPM | DIASTOLIC BLOOD PRESSURE: 80 MMHG | OXYGEN SATURATION: 98 % | WEIGHT: 131.8 LBS | TEMPERATURE: 97.7 F | SYSTOLIC BLOOD PRESSURE: 136 MMHG | BODY MASS INDEX: 24.88 KG/M2 | HEIGHT: 61 IN | HEART RATE: 75 BPM

## 2017-10-16 DIAGNOSIS — E03.9 ACQUIRED HYPOTHYROIDISM: Primary | ICD-10-CM

## 2017-10-16 DIAGNOSIS — M17.0 ARTHRITIS OF BOTH KNEES: ICD-10-CM

## 2017-10-16 RX ORDER — LEVOTHYROXINE SODIUM 75 UG/1
TABLET ORAL
Qty: 90 TAB | Refills: 2 | Status: SHIPPED | OUTPATIENT
Start: 2017-10-16 | End: 2018-01-24 | Stop reason: SDUPTHER

## 2017-10-16 RX ORDER — NABUMETONE 500 MG/1
500 TABLET, FILM COATED ORAL
Qty: 60 TAB | Refills: 2 | Status: SHIPPED | OUTPATIENT
Start: 2017-10-16 | End: 2018-10-03

## 2017-10-16 NOTE — MR AVS SNAPSHOT
Visit Information Date & Time Provider Department Dept. Phone Encounter #  
 10/16/2017  1:30 PM Jude Ramirez MD [place] [address] [facility] [phone] [id number] Follow-up Instructions Return in about 6 months (around 4/16/2018) for F/U arthritis and HTN. Upcoming Health Maintenance Date Due  
 MEDICARE YEARLY EXAM 4/6/2018 GLAUCOMA SCREENING Q2Y 5/8/2019 DTaP/Tdap/Td series (2 - Td) 4/5/2027 Allergies as of 10/16/2017  Review Complete On: 10/16/2017 By: Clyde Moreno LPN Severity Noted Reaction Type Reactions Morphine  11/09/2011    Nausea and Vomiting Did not relieve pain and couldn't sleep and caused vomiting Cortisone Low 12/31/2013   Side Effect Other (comments) Injection-she had trouble walking after injection - worsening of movement Current Immunizations  Reviewed on 9/22/2015 Name Date Influenza Vaccine 10/15/2014 Pneumococcal Conjugate (PCV-13) 9/22/2015 Pneumococcal Polysaccharide (PPSV-23) 6/17/2013 Not reviewed this visit You Were Diagnosed With   
  
 Codes Comments Acquired hypothyroidism    -  Primary ICD-10-CM: E03.9 ICD-9-CM: 244.9 Arthritis of both knees     ICD-10-CM: M17.0 ICD-9-CM: 716.96 Vitals BP Pulse Temp Resp Height(growth percentile) Weight(growth percentile) 136/80 (BP 1 Location: Left arm, BP Patient Position: Sitting) 75 97.7 °F (36.5 °C) (Oral) 18 5' 1\" (1.549 m) 131 lb 12.8 oz (59.8 kg) SpO2 BMI OB Status Smoking Status 98% 24.9 kg/m2 Hysterectomy Never Smoker BMI and BSA Data Body Mass Index Body Surface Area 24.9 kg/m 2 1.6 m 2 Preferred Pharmacy Pharmacy Name Phone [facility], [address] [facility] [phone] Your Updated Medication List  
  
   
This list is accurate as of: 10/16/17  1:55 PM.  Always use your most recent med list.  
  
  
  
  
 levothyroxine 75 mcg tablet Commonly known as:  SYNTHROID Take 1 tablet 6 days per week. losartan 25 mg tablet Commonly known as:  COZAAR  
TAKE ONE TABLET EVERY DAY  
  
 nabumetone 500 mg tablet Commonly known as:  RELAFEN Take 1 Tab by mouth two (2) times daily as needed for Pain. PRESERVISION AREDS 2 PO Take  by mouth. SYSTANE (PROPYLENE GLYCOL) 0.4-0.3 % Drop Generic drug:  peg 400-propylene glycol Administer  to left eye as needed. TYLENOL EXTRA STRENGTH 500 mg tablet Generic drug:  acetaminophen Take 1 Tab by mouth every eight (8) hours as needed for Pain. VITAMIN D3 1,000 unit tablet Generic drug:  cholecalciferol Take 2,000 Units by mouth daily. Prescriptions Sent to Pharmacy Refills  
 levothyroxine (SYNTHROID) 75 mcg tablet 2 Sig: Take 1 tablet 6 days per week. Class: Normal  
 Pharmacy: [facility] Ph #: [phone]  
 nabumetone (RELAFEN) 500 mg tablet 2 Sig: Take 1 Tab by mouth two (2) times daily as needed for Pain. Class: Normal  
 Pharmacy: [facility] [facility] Ph #: [phone] Route: Oral  
  
Follow-up Instructions Return in about 6 months (around 4/16/2018) for F/U arthritis and HTN. Patient Instructions Eat a well rounded diet. Stay physically active. Continue your current medications. Introducing [facility] & HEALTH SERVICES! [employer] introduces [employer] patient portal. Now you can access parts of your medical record, email your doctor's office, and request medication refills online. 1. In your internet browser, go to [email or web address] 2. Click on the First Time User? Click Here link in the Sign In box. You will see the New Member Sign Up page. 3. Enter your [employer] Access Code exactly as it appears below. You will not need to use this code after youve completed the sign-up process.  If you do not sign up before the expiration date, you must request a new code. · Sedicii Access Code: [email or web address] Expires: 1/14/2018  1:55 PM 
 
4. Enter the last four digits of your Social Security Number (xxxx) and Date of Birth (mm/dd/yyyy) as indicated and click Submit. You will be taken to the next sign-up page. 5. Create a Sedicii ID. This will be your Sedicii login ID and cannot be changed, so think of one that is secure and easy to remember. 6. Create a Sedicii password. You can change your password at any time. 7. Enter your Password Reset Question and Answer. This can be used at a later time if you forget your password. 8. Enter your e-mail address. You will receive e-mail notification when new information is available in [address] 9. Click Sign Up. You can now view and download portions of your medical record. 10. Click the Download Summary menu link to download a portable copy of your medical information. If you have questions, please visit the Frequently Asked Questions section of the Sedicii website. Remember, Sedicii is NOT to be used for urgent needs. For medical emergencies, dial 911. Now available from your iPhone and Android! Please provide this summary of care documentation to your next provider. Your primary care clinician is listed as Jr Mejía. If you have any questions after today's visit, please call [phone].

## 2017-10-16 NOTE — PROGRESS NOTES
Subjective:     Chief Complaint   Patient presents with   [address]     She  is a 80y.o. year old female who presents for evaluation. Has a lot of arthritis. Needs medication renewal.  Doesn't think she wants shingles vaccine. Historical Data    Past Medical History:   Diagnosis Date    Hypercholesterolemia     Hypertension     Thyroid disease     hypothyroid        Past Surgical History:   Procedure Laterality Date    ENDOSCOPY, COLON, DIAGNOSTIC  2001    HX BACK SURGERY  1984    HX BREAST BIOPSY  2010    duct removal    HX DILATION AND CURETTAGE      HX HYSTERECTOMY  1980    +BSO    HX OPEN REDUCTION INTERNAL FIXATION  1996    L forearm    VASCULAR SURGERY PROCEDURE UNLIST  1960s    vein stripping-bilat        Outpatient Encounter Prescriptions as of 10/16/2017   Medication Sig Dispense Refill    losartan (COZAAR) 25 mg tablet TAKE ONE TABLET EVERY DAY 90 Tab 2    levothyroxine (SYNTHROID) 75 mcg tablet Take 1 tablet 6 days per week. 90 Tab 1    VIT C/E/ZN/COPPR/LUTEIN/ZEAXAN (PRESERVISION AREDS 2 PO) Take  by mouth.  peg 400-propylene glycol (SYSTANE, PROPYLENE GLYCOL,) 0.4-0.3 % drop Administer  to left eye as needed.  acetaminophen (TYLENOL EXTRA STRENGTH) 500 mg tablet Take 1 Tab by mouth every eight (8) hours as needed for Pain. 1 Tab 0    cholecalciferol (VITAMIN D3) 1,000 unit tablet Take 2,000 Units by mouth daily.  nabumetone (RELAFEN) 500 mg tablet Take 1 Tab by mouth two (2) times daily as needed for Pain. 60 Tab 0     No facility-administered encounter medications on file as of 10/16/2017.          Allergies   Allergen Reactions    Morphine Nausea and Vomiting     Did not relieve pain and couldn't sleep and caused vomiting      Cortisone Other (comments)     Injection-she had trouble walking after injection - worsening of movement        Social History     Social History    Marital status:      Spouse name: N/A    Number of children: N/A    Years of education: N/A     Occupational History    Not on file. Social History Main Topics    Smoking status: Never Smoker    Smokeless tobacco: Never Used    Alcohol use No    Drug use: No    Sexual activity: Not Currently     Other Topics Concern    Not on file     Social History Narrative    Ms. Clifford Regan lives at [facility] in the independent living. Cares for her own apartment and manages her medications. Review of Systems  Pertinent items are noted in HPI. Objective:     Vitals:    10/16/17 1334   BP: 136/80   Pulse: 75   Resp: 18   Temp: 97.7 °F (36.5 °C)   TempSrc: Oral   SpO2: 98%   Weight: 131 lb 12.8 oz (59.8 kg)   Height: 5' 1\" (1.549 m)     Pleasant WF in no acute distress. Neck: Supple. Cardiac: RRR without murmurs, gallops or rubs. Lungs: Clear to auscultation. Abdomen: Benign. Neuro: Intact    ASSESSMENT / PLAN:   1. Arthritis of both knees  · Encouraged to minimize use of Relafen. · Avoid all other NSAID's  - nabumetone (RELAFEN) 500 mg tablet; Take 1 Tab by mouth two (2) times daily as needed for Pain. Dispense: 60 Tab; Refill: 2    2. Acquired hypothyroidism    - levothyroxine (SYNTHROID) 75 mcg tablet; Take 1 tablet 6 days per week. Dispense: 90 Tab; Refill: 2    Patient Instructions   Eat a well rounded diet. Stay physically active. Continue your current medications. Follow-up Disposition:  Return in about 6 months (around 4/16/2018) for F/U arthritis and HTN. Advised her to call back or return to office if symptoms worsen/change/persist.  Discussed expected course/resolution/complications of diagnosis in detail with patient. Medication risks/benefits/costs/interactions/alternatives discussed with patient. She was given an after visit summary which includes diagnoses, current medications, & vitals. She expressed understanding with the diagnosis and plan.

## 2017-10-16 NOTE — PROGRESS NOTES
Chief Complaint   Patient presents with   [facility] Establish Care     Reviewed record in preparation for visit and have obtained necessary documentation. Identified pt with two pt identifiers(name and ). Health Maintenance Due   Topic    ZOSTER VACCINE AGE 60>          Chief Complaint   Patient presents with   [facility] Readings from Last 3 Encounters:   10/16/17 131 lb 12.8 oz (59.8 kg)   17 130 lb 6.4 oz (59.1 kg)   17 136 lb (61.7 kg)     Temp Readings from Last 3 Encounters:   10/16/17 97.7 °F (36.5 °C) (Oral)   17 96.9 °F (36.1 °C) (Oral)   17 98.4 °F (36.9 °C) (Oral)     BP Readings from Last 3 Encounters:   10/16/17 136/80   17 144/72   17 110/70     Pulse Readings from Last 3 Encounters:   10/16/17 75   17 69   17 97           Learning Assessment:  :     Learning Assessment 2015   PRIMARY LEARNER Patient Patient   HIGHEST LEVEL OF EDUCATION - PRIMARY LEARNER  GRADUATED HIGH SCHOOL OR GED GRADUATED HIGH SCHOOL OR GED   BARRIERS PRIMARY LEARNER NONE NONE   CO-LEARNER CAREGIVER No No   PRIMARY LANGUAGE ENGLISH ENGLISH    NEED No No   LEARNER PREFERENCE PRIMARY DEMONSTRATION DEMONSTRATION   LEARNING SPECIAL TOPICS no no   ANSWERED BY patient patient   RELATIONSHIP SELF SELF       Depression Screening:  :     PHQ over the last two weeks 10/16/2017   Little interest or pleasure in doing things Not at all   Feeling down, depressed or hopeless Not at all   Total Score PHQ 2 0       Fall Risk Assessment:  :     Fall Risk Assessment, last 12 mths 10/16/2017   Able to walk? Yes   Fall in past 12 months? No   Fall with injury? -   Number of falls in past 12 months -   Fall Risk Score -       Abuse Screening:  :     Abuse Screening Questionnaire 2015   Do you ever feel afraid of your partner? N N   Are you in a relationship with someone who physically or mentally threatens you? N N   Is it safe for you to go home? Y Y       Coordination of Care Questionnaire:  :     1) Have you been to an emergency room, urgent care clinic since your last visit? no   Hospitalized since your last visit? no             2) Have you seen or consulted any other health care providers outside of [address] [place] [address] since your last visit? no  (Include any pap smears or colon screenings in this section.)    3) Do you have an Advance Directive on file? no    4) Are you interested in receiving information on Advance Directives? NO      Patient is accompanied by self I have received verbal consent from Caretha Brittle to discuss any/all medical information while they are present in the room. Reviewed record  In preparation for visit and have obtained necessary documentation.

## 2018-01-24 DIAGNOSIS — E03.9 ACQUIRED HYPOTHYROIDISM: ICD-10-CM

## 2018-01-24 RX ORDER — LEVOTHYROXINE SODIUM 75 UG/1
TABLET ORAL
Qty: 90 TAB | Refills: 2 | Status: SHIPPED | OUTPATIENT
Start: 2018-01-24 | End: 2018-04-16 | Stop reason: SDUPTHER

## 2018-04-16 ENCOUNTER — OFFICE VISIT (OUTPATIENT)
Dept: INTERNAL MEDICINE CLINIC | Age: 83
End: 2018-04-16

## 2018-04-16 VITALS
TEMPERATURE: 98.8 F | WEIGHT: 129.5 LBS | DIASTOLIC BLOOD PRESSURE: 60 MMHG | SYSTOLIC BLOOD PRESSURE: 130 MMHG | RESPIRATION RATE: 14 BRPM | BODY MASS INDEX: 24.45 KG/M2 | HEART RATE: 89 BPM | OXYGEN SATURATION: 98 % | HEIGHT: 61 IN

## 2018-04-16 DIAGNOSIS — M17.0 ARTHRITIS OF BOTH KNEES: ICD-10-CM

## 2018-04-16 DIAGNOSIS — E78.00 ELEVATED CHOLESTEROL: ICD-10-CM

## 2018-04-16 DIAGNOSIS — Z00.00 WELL ADULT EXAM: ICD-10-CM

## 2018-04-16 DIAGNOSIS — E03.9 ACQUIRED HYPOTHYROIDISM: ICD-10-CM

## 2018-04-16 DIAGNOSIS — Z00.00 MEDICARE ANNUAL WELLNESS VISIT, SUBSEQUENT: Primary | ICD-10-CM

## 2018-04-16 DIAGNOSIS — I10 BENIGN ESSENTIAL HYPERTENSION: ICD-10-CM

## 2018-04-16 RX ORDER — LEVOTHYROXINE SODIUM 75 UG/1
TABLET ORAL
Qty: 90 TAB | Refills: 2 | Status: SHIPPED | OUTPATIENT
Start: 2018-04-16 | End: 2019-03-28 | Stop reason: SDUPTHER

## 2018-04-16 RX ORDER — LOSARTAN POTASSIUM 25 MG/1
TABLET ORAL
Qty: 90 TAB | Refills: 2 | Status: SHIPPED | OUTPATIENT
Start: 2018-04-16 | End: 2018-06-21 | Stop reason: SDUPTHER

## 2018-04-16 NOTE — PROGRESS NOTES
Chief Complaint   Patient presents with    Hypertension    Arthritis     Reviewed record in preparation for visit and have obtained necessary documentation. Identified pt with two pt identifiers(name and ). Health Maintenance Due   Topic    MEDICARE YEARLY EXAM          Chief Complaint   Patient presents with    Hypertension    Arthritis        Wt Readings from Last 3 Encounters:   18 129 lb 8 oz (58.7 kg)   10/16/17 131 lb 12.8 oz (59.8 kg)   17 130 lb 6.4 oz (59.1 kg)     Temp Readings from Last 3 Encounters:   18 98.8 °F (37.1 °C) (Oral)   10/16/17 97.7 °F (36.5 °C) (Oral)   17 96.9 °F (36.1 °C) (Oral)     BP Readings from Last 3 Encounters:   18 130/60   10/16/17 136/80   17 144/72     Pulse Readings from Last 3 Encounters:   18 89   10/16/17 75   17 69           Learning Assessment:  :     Learning Assessment 2015   PRIMARY LEARNER Patient Patient   HIGHEST LEVEL OF EDUCATION - PRIMARY LEARNER  GRADUATED HIGH SCHOOL OR GED GRADUATED HIGH SCHOOL OR GED   BARRIERS PRIMARY LEARNER NONE NONE   CO-LEARNER CAREGIVER No No   PRIMARY LANGUAGE ENGLISH ENGLISH    NEED No No   LEARNER PREFERENCE PRIMARY DEMONSTRATION DEMONSTRATION   LEARNING SPECIAL TOPICS no no   ANSWERED BY patient patient   RELATIONSHIP SELF SELF       Depression Screening:  :     PHQ over the last two weeks 10/16/2017   Little interest or pleasure in doing things Not at all   Feeling down, depressed or hopeless Not at all   Total Score PHQ 2 0       Fall Risk Assessment:  :     Fall Risk Assessment, last 12 mths 10/16/2017   Able to walk? Yes   Fall in past 12 months? No   Fall with injury? -   Number of falls in past 12 months -   Fall Risk Score -       Abuse Screening:  :     Abuse Screening Questionnaire 2015   Do you ever feel afraid of your partner? N N   Are you in a relationship with someone who physically or mentally threatens you?  Carmelo Reyes Is it safe for you to go home? Y Y       Coordination of Care Questionnaire:  :     1) Have you been to an emergency room, urgent care clinic since your last visit? no   Hospitalized since your last visit? no             2) Have you seen or consulted any other health care providers outside of [address] [place] [address] since your last visit? no  (Include any pap smears or colon screenings in this section.)    3) Do you have an Advance Directive on file? no    4) Are you interested in receiving information on Advance Directives? NO      Patient is accompanied by self I have received verbal consent from Sushma Jimenez to discuss any/all medical information while they are present in the room. Reviewed record  In preparation for visit and have obtained necessary documentation.

## 2018-04-16 NOTE — PATIENT INSTRUCTIONS
Stay physically active. Continue your current medications. Return to office for fasting labs. Medicare Wellness Visit, Female    The best way to live healthy is to have a healthy lifestyle by eating a well-balanced diet, exercising regularly, limiting alcohol and stopping smoking. Regular physical exams and screening tests are another way to keep healthy. Preventive exams provided by your health care provider can find health problems before they become diseases or illnesses. Preventive services including immunizations, screening tests, monitoring and exams can help you take care of your own health. All people over age 72 should have a pneumovax  and and a prevnar shot to prevent pneumonia. These are once in a lifetime unless you and your provider decide differently. All people over 65 should have a yearly flu shot and a tetanus vaccine every 10 years. A bone mass density to screen for osteoporosis or thinning of the bones should be done every 2 years after 65. Screening for diabetes mellitus with a blood sugar test should be done every year. Glaucoma is a disease of the eye due to increased ocular pressure that can lead to blindness and it should be done every year by an eye professional.    Cardiovascular screening tests that check for elevated lipids (fatty part of blood) which can lead to heart disease and strokes should be done every 5 years. Colorectal screening that evaluates for blood or polyps in your colon should be done yearly as a stool test or every five years as a flexible sigmoidoscope or every 10 years as a colonoscopy up to age 76. Breast cancer screening with a mammogram is recommended biennially  for women age 54-69. Screening for cervical cancer with a pap smear and pelvic exam is recommended for women after age 72 years every 2 years up to age 79 or when the provider and patient decide to stop. If there is a history of cervical abnormalities or other increased risk for cancer then the test is recommended yearly. Hepatitis C screening is also recommended for anyone born between [birth year] through [address]. A shingles vaccine is also recommended once in a lifetime after age 61. Your Medicare Wellness Exam is recommended annually. Here is a list of your current Health Maintenance items with a due date: There are no preventive care reminders to display for this patient.

## 2018-04-16 NOTE — MR AVS SNAPSHOT
[address] [facility], [address] [place] [address] 
[phone] Patient: Elsy Alvarenga MRN: [id number] Visit Information Date & Time Provider Department Dept. Phone Encounter #  
 2018  1:30 PM [facility] [phone] Follow-up Instructions Return in about 6 months (around 10/16/2018) for F/U HTN. Upcoming Health Maintenance Date Due  
 MEDICARE YEARLY EXAM 2019 GLAUCOMA SCREENING Q2Y 2019 DTaP/Tdap/Td series (2 - Td) 2027 Allergies as of 2018  Review Complete On: 2018 By: Karrie Cooper MD  
  
 Severity Noted Reaction Type Reactions Morphine  2011    Nausea and Vomiting Did not relieve pain and couldn't sleep and caused vomiting Cortisone Low 2013   Side Effect Other (comments) Injection-she had trouble walking after injection - worsening of movement Current Immunizations  Reviewed on 2015 Name Date Influenza Vaccine 10/15/2014 Pneumococcal Conjugate (PCV-13) 2015 Pneumococcal Polysaccharide (PPSV-23) 2013 Not reviewed this visit You Were Diagnosed With   
  
 Codes Comments Arthritis of both knees    -  Primary ICD-10-CM: M17.0 ICD-9-CM: 716.96 Acquired hypothyroidism     ICD-10-CM: E03.9 ICD-9-CM: 244.9 Benign essential hypertension     ICD-10-CM: I10 
ICD-9-CM: 401.1 Elevated cholesterol     ICD-10-CM: E78.00 ICD-9-CM: 272.0 Well adult exam     ICD-10-CM: Z00.00 ICD-9-CM: V70.0 Vitals BP Pulse Temp Resp Height(growth percentile) Weight(growth percentile) 130/60 (BP 1 Location: Left arm, BP Patient Position: Sitting) 89 98.8 °F (37.1 °C) (Oral) 14 5' 1\" (1.549 m) 129 lb 8 oz (58.7 kg) SpO2 BMI OB Status Smoking Status 98% 24.47 kg/m2 Hysterectomy Never Smoker BMI and BSA Data Body Mass Index Body Surface Area  
 24.47 kg/m 2 1.59 m 2 Preferred Pharmacy Pharmacy Name Phone [facility] [address] [facility] [phone] Your Updated Medication List  
  
   
This list is accurate as of 4/16/18  2:06 PM.  Always use your most recent med list.  
  
  
  
  
 levothyroxine 75 mcg tablet Commonly known as:  SYNTHROID Take 1 tablet 6 days per week. losartan 25 mg tablet Commonly known as:  COZAAR  
TAKE ONE TABLET EVERY DAY  
  
 nabumetone 500 mg tablet Commonly known as:  RELAFEN Take 1 Tab by mouth two (2) times daily as needed for Pain. PRESERVISION AREDS 2 PO Take  by mouth. SYSTANE (PROPYLENE GLYCOL) 0.4-0.3 % Drop Generic drug:  peg 400-propylene glycol Administer  to left eye as needed. TYLENOL EXTRA STRENGTH 500 mg tablet Generic drug:  acetaminophen Take 1 Tab by mouth every eight (8) hours as needed for Pain. VITAMIN D3 1,000 unit tablet Generic drug:  cholecalciferol Take 2,000 Units by mouth daily. Prescriptions Sent to Pharmacy Refills  
 levothyroxine (SYNTHROID) 75 mcg tablet 2 Sig: Take 1 tablet 6 days per week. Class: Normal  
 Pharmacy: [facility] Ph #: [phone]  
 losartan (COZAAR) 25 mg tablet 2 Sig: TAKE ONE TABLET EVERY DAY Class: Normal  
 Pharmacy: [facility] [address] [facility] Ph #: [phone] Follow-up Instructions Return in about 6 months (around 10/16/2018) for F/U HTN. To-Do List   
 04/17/2018 Lab:  CBC WITH AUTOMATED DIFF   
  
 04/17/2018 Lab:  LIPID PANEL   
  
 04/17/2018 Lab:  METABOLIC PANEL, COMPREHENSIVE   
  
 04/17/2018 Lab:  TSH REFLEX TO T4   
  
 04/17/2018 Lab:  URINALYSIS W/ RFLX MICROSCOPIC Patient Instructions Stay physically active. Continue your current medications. Return to office for fasting labs. Medicare Wellness Visit, Female The best way to live healthy is to have a healthy lifestyle by eating a well-balanced diet, exercising regularly, limiting alcohol and stopping smoking. Regular physical exams and screening tests are another way to keep healthy. Preventive exams provided by your health care provider can find health problems before they become diseases or illnesses. Preventive services including immunizations, screening tests, monitoring and exams can help you take care of your own health. All people over age 72 should have a pneumovax  and and a prevnar shot to prevent pneumonia. These are once in a lifetime unless you and your provider decide differently. All people over 65 should have a yearly flu shot and a tetanus vaccine every 10 years. A bone mass density to screen for osteoporosis or thinning of the bones should be done every 2 years after 65. Screening for diabetes mellitus with a blood sugar test should be done every year. Glaucoma is a disease of the eye due to increased ocular pressure that can lead to blindness and it should be done every year by an eye professional. 
 
Cardiovascular screening tests that check for elevated lipids (fatty part of blood) which can lead to heart disease and strokes should be done every 5 years. Colorectal screening that evaluates for blood or polyps in your colon should be done yearly as a stool test or every five years as a flexible sigmoidoscope or every 10 years as a colonoscopy up to age 76. Breast cancer screening with a mammogram is recommended biennially  for women age 54-69. Screening for cervical cancer with a pap smear and pelvic exam is recommended for women after age 72 years every 2 years up to age 79 or when the provider and patient decide to stop. If there is a history of cervical abnormalities or other increased risk for cancer then the test is recommended yearly. Hepatitis C screening is also recommended for anyone born between [birth year] through [address]. A shingles vaccine is also recommended once in a lifetime after age 61. Your Medicare Wellness Exam is recommended annually. Here is a list of your current Health Maintenance items with a due date: There are no preventive care reminders to display for this patient. Introducing [facility]! Jos Newton introduces [employer] patient portal. Now you can access parts of your medical record, email your doctor's office, and request medication refills online. 1. In your internet browser, go to [email or web address] 2. Click on the First Time User? Click Here link in the Sign In box. You will see the New Member Sign Up page. 3. Enter your [employer] Access Code exactly as it appears below. You will not need to use this code after youve completed the sign-up process. If you do not sign up before the expiration date, you must request a new code. · [employer] Access Code: [email or web address] Expires: 7/15/2018  2:06 PM 
 
4. Enter the last four digits of your Social Security Number (xxxx) and Date of Birth (mm/dd/yyyy) as indicated and click Submit. You will be taken to the next sign-up page. 5. Create a [employer] ID. This will be your [employer] login ID and cannot be changed, so think of one that is secure and easy to remember. 6. Create a [employer] password. You can change your password at any time. 7. Enter your Password Reset Question and Answer. This can be used at a later time if you forget your password. 8. Enter your e-mail address. You will receive e-mail notification when new information is available in [address] 9. Click Sign Up. You can now view and download portions of your medical record. 10. Click the Download Summary menu link to download a portable copy of your medical information.  
 
If you have questions, please visit the Frequently Asked Questions section of the [employer]. Remember, [employer] is NOT to be used for urgent needs. For medical emergencies, dial 911. Now available from your iPhone and Android! Please provide this summary of care documentation to your next provider. Your primary care clinician is listed as Jessica Parsons. If you have any questions after today's visit, please call [phone].

## 2018-04-16 NOTE — PROGRESS NOTES
Subjective:     Chief Complaint   Patient presents with    Hypertension    Arthritis     She  is a 80y.o. year old female who presents for evaluation. Still with arthritis. Takes medication rarely. Gets easily winded. Due for Medicare wellness exam.      Historical Data    Past Medical History:   Diagnosis Date    Hypercholesterolemia     Hypertension     Thyroid disease     hypothyroid        Past Surgical History:   Procedure Laterality Date    ENDOSCOPY, COLON, DIAGNOSTIC  2001    HX BACK SURGERY  1984    HX BREAST BIOPSY  2010    duct removal    HX DILATION AND CURETTAGE      HX HYSTERECTOMY  1980    +BSO    HX OPEN REDUCTION INTERNAL FIXATION  1996    L forearm    VASCULAR SURGERY PROCEDURE UNLIST  1960s    vein stripping-bilat        Outpatient Encounter Prescriptions as of 4/16/2018   Medication Sig Dispense Refill    levothyroxine (SYNTHROID) 75 mcg tablet Take 1 tablet 6 days per week. 90 Tab 2    nabumetone (RELAFEN) 500 mg tablet Take 1 Tab by mouth two (2) times daily as needed for Pain. 60 Tab 2    losartan (COZAAR) 25 mg tablet TAKE ONE TABLET EVERY DAY 90 Tab 2    VIT C/E/ZN/COPPR/LUTEIN/ZEAXAN (PRESERVISION AREDS 2 PO) Take  by mouth.  peg 400-propylene glycol (SYSTANE, PROPYLENE GLYCOL,) 0.4-0.3 % drop Administer  to left eye as needed.  acetaminophen (TYLENOL EXTRA STRENGTH) 500 mg tablet Take 1 Tab by mouth every eight (8) hours as needed for Pain. 1 Tab 0    cholecalciferol (VITAMIN D3) 1,000 unit tablet Take 2,000 Units by mouth daily. No facility-administered encounter medications on file as of 4/16/2018.          Allergies   Allergen Reactions    Morphine Nausea and Vomiting     Did not relieve pain and couldn't sleep and caused vomiting      Cortisone Other (comments)     Injection-she had trouble walking after injection - worsening of movement        Social History     Social History    Marital status:      Spouse name: N/A    Number of children: N/A    Years of education: N/A     Occupational History    Not on file. Social History Main Topics    Smoking status: Never Smoker    Smokeless tobacco: Never Used    Alcohol use No    Drug use: No    Sexual activity: Not Currently     Other Topics Concern    Not on file     Social History Narrative    Ms. Iwona Booker lives at [facility] in the independent living. Cares for her own apartment and manages her medications. Review of Systems  A comprehensive review of systems was negative except for that written in the HPI. Objective:     Vitals:    04/16/18 1339   BP: 130/60   Pulse: 89   Resp: 14   Temp: 98.8 °F (37.1 °C)   TempSrc: Oral   SpO2: 98%   Weight: 129 lb 8 oz (58.7 kg)   Height: 5' 1\" (1.549 m)     Pleasant WF in no acute distress. Neck: Supple. Cardiac: RRR without murmurs, gallops or rubs. Lungs: Clear to auscultation. Abdomen: Benign  Neuro: Intact    ASSESSMENT / PLAN:   1. Medicare annual wellness visit, subsequent  · Completed    2. Acquired hypothyroidism  · Continue current therapy. - levothyroxine (SYNTHROID) 75 mcg tablet; Take 1 tablet 6 days per week. Dispense: 90 Tab; Refill: 2  - TSH REFLEX TO T4; Future    3. Benign essential hypertension  · Low salt  DASH diet. - losartan (COZAAR) 25 mg tablet; TAKE ONE TABLET EVERY DAY  Dispense: 90 Tab; Refill: 2  - URINALYSIS W/ RFLX MICROSCOPIC; Future  - METABOLIC PANEL, COMPREHENSIVE; Future    4. Arthritis of both knees  · Limit Relafen use    5. Elevated cholesterol  · Continue diet efforts.  - LIPID PANEL; Future    6. Well adult exam    - CBC WITH AUTOMATED DIFF; Future    Patient Instructions   Stay physically active. Continue your current medications. Return to office for fasting labs. Medicare Wellness Visit, Female    The best way to live healthy is to have a healthy lifestyle by eating a well-balanced diet, exercising regularly, limiting alcohol and stopping smoking.     Regular physical exams and screening tests are another way to keep healthy. Preventive exams provided by your health care provider can find health problems before they become diseases or illnesses. Preventive services including immunizations, screening tests, monitoring and exams can help you take care of your own health. All people over age 72 should have a pneumovax  and and a prevnar shot to prevent pneumonia. These are once in a lifetime unless you and your provider decide differently. All people over 65 should have a yearly flu shot and a tetanus vaccine every 10 years. A bone mass density to screen for osteoporosis or thinning of the bones should be done every 2 years after 65. Screening for diabetes mellitus with a blood sugar test should be done every year. Glaucoma is a disease of the eye due to increased ocular pressure that can lead to blindness and it should be done every year by an eye professional.    Cardiovascular screening tests that check for elevated lipids (fatty part of blood) which can lead to heart disease and strokes should be done every 5 years. Colorectal screening that evaluates for blood or polyps in your colon should be done yearly as a stool test or every five years as a flexible sigmoidoscope or every 10 years as a colonoscopy up to age 76. Breast cancer screening with a mammogram is recommended biennially  for women age 54-69. Screening for cervical cancer with a pap smear and pelvic exam is recommended for women after age 72 years every 2 years up to age 79 or when the provider and patient decide to stop. If there is a history of cervical abnormalities or other increased risk for cancer then the test is recommended yearly. Hepatitis C screening is also recommended for anyone born between [birth year] through [address]. A shingles vaccine is also recommended once in a lifetime after age 61. Your Medicare Wellness Exam is recommended annually.     Here is a list of your current Health Maintenance items with a due date: There are no preventive care reminders to display for this patient. Follow-up Disposition:  Return in about 6 months (around 10/16/2018) for F/U HTN. Advised her to call back or return to office if symptoms worsen/change/persist.  Discussed expected course/resolution/complications of diagnosis in detail with patient. Medication risks/benefits/costs/interactions/alternatives discussed with patient. She was given an after visit summary which includes diagnoses, current medications, & vitals. She expressed understanding with the diagnosis and plan. This is the Subsequent Medicare Annual Wellness Exam, performed 12 months or more after the Initial AWV or the last Subsequent AWV    I have reviewed the patient's medical history in detail and updated the computerized patient record. History     Past Medical History:   Diagnosis Date    Hypercholesterolemia     Hypertension     Thyroid disease     hypothyroid      Past Surgical History:   Procedure Laterality Date    ENDOSCOPY, COLON, DIAGNOSTIC  2001    HX BACK SURGERY  1984    HX BREAST BIOPSY  2010    duct removal    HX DILATION AND CURETTAGE      HX HYSTERECTOMY  1980 +BSO    HX OPEN REDUCTION INTERNAL FIXATION  1996    L forearm    VASCULAR SURGERY PROCEDURE UNLIST  1960s    vein stripping-bilat     Current Outpatient Prescriptions   Medication Sig Dispense Refill    levothyroxine (SYNTHROID) 75 mcg tablet Take 1 tablet 6 days per week. 90 Tab 2    losartan (COZAAR) 25 mg tablet TAKE ONE TABLET EVERY DAY 90 Tab 2    nabumetone (RELAFEN) 500 mg tablet Take 1 Tab by mouth two (2) times daily as needed for Pain. 60 Tab 2    VIT C/E/ZN/COPPR/LUTEIN/ZEAXAN (PRESERVISION AREDS 2 PO) Take  by mouth.  peg 400-propylene glycol (SYSTANE, PROPYLENE GLYCOL,) 0.4-0.3 % drop Administer  to left eye as needed.       acetaminophen (TYLENOL EXTRA STRENGTH) 500 mg tablet Take 1 Tab by mouth every eight (8) hours as needed for Pain. 1 Tab 0    cholecalciferol (VITAMIN D3) 1,000 unit tablet Take 2,000 Units by mouth daily. Allergies   Allergen Reactions    Morphine Nausea and Vomiting     Did not relieve pain and couldn't sleep and caused vomiting      Cortisone Other (comments)     Injection-she had trouble walking after injection - worsening of movement     Family History   Problem Relation Age of Onset    Cancer Father      liver    Colon Cancer Mother     Cancer Sister      liver    Cancer Brother      liver    Colon Polyps Sister     Thyroid Cancer Daughter      Social History   Substance Use Topics    Smoking status: Never Smoker    Smokeless tobacco: Never Used    Alcohol use No     Patient Active Problem List   Diagnosis Code    Colon polyp K63.5    Elevated cholesterol E78.00    Benign essential hypertension I10    Acquired hypothyroidism E03.9    Cataract H26.9    Arthritis of both knees M17.0    TB (tuberculosis), treated A15.9    H/O bone density study Z92.89    Episodic lightheadedness R42    Vitamin D deficiency E55.9       Depression Risk Factor Screening:     PHQ over the last two weeks 10/16/2017   Little interest or pleasure in doing things Not at all   Feeling down, depressed or hopeless Not at all   Total Score PHQ 2 0     Alcohol Risk Factor Screening: You do not drink alcohol or very rarely. Functional Ability and Level of Safety:   Hearing Loss  Hearing is good. Activities of Daily Living  The home contains: no safety equipment. Patient does total self care    Fall Risk  Fall Risk Assessment, last 12 mths 10/16/2017   Able to walk? Yes   Fall in past 12 months?  No   Fall with injury? -   Number of falls in past 12 months -   Fall Risk Score -       Abuse Screen  Patient is not abused    Cognitive Screening   Evaluation of Cognitive Function:  Has your family/caregiver stated any concerns about your memory: no  Normal    Patient Care Team Patient Care Team:  Rachel Person MD as PCP - General (Internal Medicine)  Hayden Stafford MD as Physician (Orthopedic Surgery)  Panchito Grimm MD as Physician (Ophthalmology)    Assessment/Plan   Education and counseling provided:  Are appropriate based on today's review and evaluation  End-of-Life planning (with patient's consent)    Diagnoses and all orders for this visit:    1. Medicare annual wellness visit, subsequent    2. Acquired hypothyroidism  -     levothyroxine (SYNTHROID) 75 mcg tablet; Take 1 tablet 6 days per week. -     TSH REFLEX TO T4; Future    3. Benign essential hypertension  -     losartan (COZAAR) 25 mg tablet; TAKE ONE TABLET EVERY DAY  -     URINALYSIS W/ RFLX MICROSCOPIC; Future  -     METABOLIC PANEL, COMPREHENSIVE; Future    4. Arthritis of both knees    5. Elevated cholesterol  -     LIPID PANEL; Future    6. Well adult exam  -     CBC WITH AUTOMATED DIFF; Future        There are no preventive care reminders to display for this patient.

## 2018-04-18 ENCOUNTER — HOSPITAL ENCOUNTER (OUTPATIENT)
Dept: LAB | Age: 83
Discharge: HOME | End: 2018-04-18
Payer: MEDICARE

## 2018-04-18 PROCEDURE — 80048 BASIC METABOLIC PNL TOTAL CA: CPT

## 2018-04-18 PROCEDURE — 36415 COLL VENOUS BLD VENIPUNCTURE: CPT

## 2018-04-18 PROCEDURE — 82306 VITAMIN D 25 HYDROXY: CPT

## 2018-04-18 PROCEDURE — 85027 COMPLETE CBC AUTOMATED: CPT

## 2018-04-18 PROCEDURE — 84443 ASSAY THYROID STIM HORMONE: CPT

## 2018-04-19 LAB
25(OH)D3+25(OH)D2 SERPL-MCNC: 32.8 NG/ML (ref 30–100)
BUN SERPL-MCNC: 24 MG/DL (ref 10–36)
BUN/CREAT SERPL: 40 (ref 12–28)
CALCIUM SERPL-MCNC: 9.1 MG/DL (ref 8.7–10.3)
CHLORIDE SERPL-SCNC: 99 MMOL/L (ref 96–106)
CO2 SERPL-SCNC: 26 MMOL/L (ref 18–29)
CREAT SERPL-MCNC: 0.6 MG/DL (ref 0.57–1)
ERYTHROCYTE [DISTWIDTH] IN BLOOD BY AUTOMATED COUNT: 15 % (ref 12.3–15.4)
GFR SERPLBLD CREATININE-BSD FMLA CKD-EPI: 79 ML/MIN/1.73
GFR SERPLBLD CREATININE-BSD FMLA CKD-EPI: 92 ML/MIN/1.73
GLUCOSE SERPL-MCNC: 89 MG/DL (ref 65–99)
HCT VFR BLD AUTO: 38 % (ref 34–46.6)
HGB BLD-MCNC: 12.3 G/DL (ref 11.1–15.9)
MCH RBC QN AUTO: 29.1 PG (ref 26.6–33)
MCHC RBC AUTO-ENTMCNC: 32.4 G/DL (ref 31.5–35.7)
MCV RBC AUTO: 90 FL (ref 79–97)
PLATELET # BLD AUTO: 197 X10E3/UL (ref 150–379)
POTASSIUM SERPL-SCNC: 4.3 MMOL/L (ref 3.5–5.2)
RBC # BLD AUTO: 4.23 X10E6/UL (ref 3.77–5.28)
SODIUM SERPL-SCNC: 139 MMOL/L (ref 134–144)
TSH SERPL DL<=0.005 MIU/L-ACNC: 1.92 UIU/ML (ref 0.45–4.5)
WBC # BLD AUTO: 5.7 X10E3/UL (ref 3.4–10.8)

## 2018-05-10 ENCOUNTER — TELEPHONE (OUTPATIENT)
Dept: INTERNAL MEDICINE CLINIC | Age: 83
End: 2018-05-10

## 2018-05-10 NOTE — TELEPHONE ENCOUNTER
Karina Burgess 12/18/1925     Pt states that she came into the office 4/18/18 and had lab work down but has not gotten the results. Pt would like a call with results.

## 2018-06-21 DIAGNOSIS — I10 BENIGN ESSENTIAL HYPERTENSION: ICD-10-CM

## 2018-06-21 RX ORDER — LOSARTAN POTASSIUM 25 MG/1
TABLET ORAL
Qty: 90 TAB | Refills: 2 | Status: SHIPPED | OUTPATIENT
Start: 2018-06-21 | End: 2019-06-12 | Stop reason: SDUPTHER

## 2018-10-03 ENCOUNTER — OFFICE VISIT (OUTPATIENT)
Dept: INTERNAL MEDICINE CLINIC | Age: 83
End: 2018-10-03

## 2018-10-03 VITALS
BODY MASS INDEX: 24.15 KG/M2 | SYSTOLIC BLOOD PRESSURE: 130 MMHG | TEMPERATURE: 98.7 F | OXYGEN SATURATION: 98 % | WEIGHT: 127.9 LBS | RESPIRATION RATE: 16 BRPM | DIASTOLIC BLOOD PRESSURE: 70 MMHG | HEIGHT: 61 IN | HEART RATE: 84 BPM

## 2018-10-03 DIAGNOSIS — I10 BENIGN ESSENTIAL HYPERTENSION: Primary | ICD-10-CM

## 2018-10-03 DIAGNOSIS — Z23 ENCOUNTER FOR IMMUNIZATION: ICD-10-CM

## 2018-10-03 NOTE — PATIENT INSTRUCTIONS
Continue to follow a low sodium diet. Continue your current medications. Get the shingles shot at your pharmacy. Low Sodium Diet (2,000 Milligram): Care Instructions  Your Care Instructions    Too much sodium causes your body to hold on to extra water. This can raise your blood pressure and force your heart and kidneys to work harder. In very serious cases, this could cause you to be put in the hospital. It might even be life-threatening. By limiting sodium, you will feel better and lower your risk of serious problems. The most common source of sodium is salt. People get most of the salt in their diet from canned, prepared, and packaged foods. Fast food and restaurant meals also are very high in sodium. Your doctor will probably limit your sodium to less than 2,000 milligrams (mg) a day. This limit counts all the sodium in prepared and packaged foods and any salt you add to your food. Follow-up care is a key part of your treatment and safety. Be sure to make and go to all appointments, and call your doctor if you are having problems. It's also a good idea to know your test results and keep a list of the medicines you take. How can you care for yourself at home? Read food labels  · Read labels on cans and food packages. The labels tell you how much sodium is in each serving. Make sure that you look at the serving size. If you eat more than the serving size, you have eaten more sodium. · Food labels also tell you the Percent Daily Value for sodium. Choose products with low Percent Daily Values for sodium. · Be aware that sodium can come in forms other than salt, including monosodium glutamate (MSG), sodium citrate, and sodium bicarbonate (baking soda). MSG is often added to Asian food. When you eat out, you can sometimes ask for food without MSG or added salt.   Buy low-sodium foods  · Buy foods that are labeled \"unsalted\" (no salt added), \"sodium-free\" (less than 5 mg of sodium per serving), or \"low-sodium\" (less than 140 mg of sodium per serving). Foods labeled \"reduced-sodium\" and \"light sodium\" may still have too much sodium. Be sure to read the label to see how much sodium you are getting. · Buy fresh vegetables, or frozen vegetables without added sauces. Buy low-sodium versions of canned vegetables, soups, and other canned goods. Prepare low-sodium meals  · Cut back on the amount of salt you use in cooking. This will help you adjust to the taste. Do not add salt after cooking. One teaspoon of salt has about 2,300 mg of sodium. · Take the salt shaker off the table. · Flavor your food with garlic, lemon juice, onion, vinegar, herbs, and spices. Do not use soy sauce, lite soy sauce, steak sauce, onion salt, garlic salt, celery salt, mustard, or ketchup on your food. · Use low-sodium salad dressings, sauces, and ketchup. Or make your own salad dressings and sauces without adding salt. · Use less salt (or none) when recipes call for it. You can often use half the salt a recipe calls for without losing flavor. Other foods such as rice, pasta, and grains do not need added salt. · Rinse canned vegetables, and cook them in fresh water. This removes some--but not all--of the salt. · Avoid water that is naturally high in sodium or that has been treated with water softeners, which add sodium. Call your local water company to find out the sodium content of your water supply. If you buy bottled water, read the label and choose a sodium-free brand. Avoid high-sodium foods  · Avoid eating:  ¨ Smoked, cured, salted, and canned meat, fish, and poultry. ¨ Ham, quevedo, hot dogs, and luncheon meats. ¨ Regular, hard, and processed cheese and regular peanut butter. ¨ Crackers with salted tops, and other salted snack foods such as pretzels, chips, and salted popcorn. ¨ Frozen prepared meals, unless labeled low-sodium.   ¨ Canned and dried soups, broths, and bouillon, unless labeled sodium-free or low-sodium. ¨ Canned vegetables, unless labeled sodium-free or low-sodium. ¨ Western Megan fries, pizza, tacos, and other fast foods. ¨ Pickles, olives, ketchup, and other condiments, especially soy sauce, unless labeled sodium-free or low-sodium. Where can you learn more? Go to http://steve-cassi.info/. Enter O480 in the search box to learn more about \"Low Sodium Diet (2,000 Milligram): Care Instructions. \"  Current as of: March 29, 2018  Content Version: 11.8  © 9386-4788 [employer]. Care instructions adapted under license by [employer] (which disclaims liability or warranty for this information). If you have questions about a medical condition or this instruction, always ask your healthcare professional. [address] any warranty or liability for your use of this information.

## 2018-10-03 NOTE — MR AVS SNAPSHOT
[address] [facility], [address] [place] [address] 
[phone] Patient: Bernard Pool MRN: [id number] Visit Information Date & Time Provider Department Dept. Phone Encounter #  
 10/3/2018  3:30 PM Toyin Avendaño MD [place] [id number] [facility] [phone] Follow-up Instructions Return in about 6 months (around 4/3/2019) for F/U HTN. Upcoming Health Maintenance Date Due Shingrix Vaccine Age 50> (1 of 2) 2018* Influenza Age 5 to Adult 3/31/2019* MEDICARE YEARLY EXAM 2019 GLAUCOMA SCREENING Q2Y 2020 DTaP/Tdap/Td series (2 - Td) 2027 *Topic was postponed. The date shown is not the original due date. Allergies as of 10/3/2018  Review Complete On: 10/3/2018 By: Toyin Avendaño MD  
  
 Severity Noted Reaction Type Reactions Morphine  2011    Nausea and Vomiting Did not relieve pain and couldn't sleep and caused vomiting Cortisone Low 2013   Side Effect Other (comments) Injection-she had trouble walking after injection - worsening of movement Current Immunizations  Reviewed on 2015 Name Date Influenza Vaccine 10/15/2014 Pneumococcal Conjugate (PCV-13) 2015 Pneumococcal Polysaccharide (PPSV-23) 2013 Not reviewed this visit You Were Diagnosed With   
  
 Codes Comments Benign essential hypertension    -  Primary ICD-10-CM: I10 
ICD-9-CM: 401.1 Encounter for immunization     ICD-10-CM: P81 ICD-9-CM: V03.89 Vitals BP Pulse Temp Resp Height(growth percentile) Weight(growth percentile) 130/70 (BP 1 Location: Left arm, BP Patient Position: Sitting) 84 98.7 °F (37.1 °C) (Oral) 16 5' 1\" (1.549 m) 127 lb 14.4 oz (58 kg) SpO2 BMI OB Status Smoking Status 98% 24.17 kg/m2 Hysterectomy Never Smoker BMI and BSA Data Body Mass Index Body Surface Area 24.17 kg/m 2 1.58 m 2 Preferred Pharmacy Pharmacy Name Phone [facility], [address] [facility] [phone] Your Updated Medication List  
  
   
This list is accurate as of 10/3/18  4:15 PM.  Always use your most recent med list.  
  
  
  
  
 levothyroxine 75 mcg tablet Commonly known as:  SYNTHROID Take 1 tablet 6 days per week. losartan 25 mg tablet Commonly known as:  COZAAR  
TAKE ONE TABLET EVERY DAY  
  
 varicella-zoster recombinant (PF) 50 mcg/0.5 mL Susr injection Commonly known as:  SHINGRIX (PF)  
0.5 mL by IntraMUSCular route once for 1 dose. Indications: PREVENTION OF HERPES ZOSTER  
  
 VITAMIN D3 1,000 unit tablet Generic drug:  cholecalciferol Take 2,000 Units by mouth daily. Prescriptions Printed Refills  
 varicella-zoster recombinant, PF, (SHINGRIX, PF,) 50 mcg/0.5 mL susr injection 0 Si.5 mL by IntraMUSCular route once for 1 dose. Indications: PREVENTION OF HERPES ZOSTER Class: Print Route: IntraMUSCular Follow-up Instructions Return in about 6 months (around 4/3/2019) for F/U HTN. Patient Instructions Continue to follow a low sodium diet. Continue your current medications. Get the shingles shot at your pharmacy. Low Sodium Diet (2,000 Milligram): Care Instructions Your Care Instructions Too much sodium causes your body to hold on to extra water. This can raise your blood pressure and force your heart and kidneys to work harder. In very serious cases, this could cause you to be put in the hospital. It might even be life-threatening. By limiting sodium, you will feel better and lower your risk of serious problems. The most common source of sodium is salt. People get most of the salt in their diet from canned, prepared, and packaged foods. Fast food and restaurant meals also are very high in sodium.  Your doctor will probably limit your sodium to less than 2,000 milligrams (mg) a day. This limit counts all the sodium in prepared and packaged foods and any salt you add to your food. Follow-up care is a key part of your treatment and safety. Be sure to make and go to all appointments, and call your doctor if you are having problems. It's also a good idea to know your test results and keep a list of the medicines you take. How can you care for yourself at home? Read food labels · Read labels on cans and food packages. The labels tell you how much sodium is in each serving. Make sure that you look at the serving size. If you eat more than the serving size, you have eaten more sodium. · Food labels also tell you the Percent Daily Value for sodium. Choose products with low Percent Daily Values for sodium. · Be aware that sodium can come in forms other than salt, including monosodium glutamate (MSG), sodium citrate, and sodium bicarbonate (baking soda). MSG is often added to Asian food. When you eat out, you can sometimes ask for food without MSG or added salt. Buy low-sodium foods · Buy foods that are labeled \"unsalted\" (no salt added), \"sodium-free\" (less than 5 mg of sodium per serving), or \"low-sodium\" (less than 140 mg of sodium per serving). Foods labeled \"reduced-sodium\" and \"light sodium\" may still have too much sodium. Be sure to read the label to see how much sodium you are getting. · Buy fresh vegetables, or frozen vegetables without added sauces. Buy low-sodium versions of canned vegetables, soups, and other canned goods. Prepare low-sodium meals · Cut back on the amount of salt you use in cooking. This will help you adjust to the taste. Do not add salt after cooking. One teaspoon of salt has about 2,300 mg of sodium. · Take the salt shaker off the table. · Flavor your food with garlic, lemon juice, onion, vinegar, herbs, and spices.  Do not use soy sauce, lite soy sauce, steak sauce, onion salt, garlic salt, celery salt, mustard, or ketchup on your food. · Use low-sodium salad dressings, sauces, and ketchup. Or make your own salad dressings and sauces without adding salt. · Use less salt (or none) when recipes call for it. You can often use half the salt a recipe calls for without losing flavor. Other foods such as rice, pasta, and grains do not need added salt. · Rinse canned vegetables, and cook them in fresh water. This removes somebut not allof the salt. · Avoid water that is naturally high in sodium or that has been treated with water softeners, which add sodium. Call your local water company to find out the sodium content of your water supply. If you buy bottled water, read the label and choose a sodium-free brand. Avoid high-sodium foods · Avoid eating: ¨ Smoked, cured, salted, and canned meat, fish, and poultry. ¨ Ham, quevedo, hot dogs, and luncheon meats. ¨ Regular, hard, and processed cheese and regular peanut butter. ¨ Crackers with salted tops, and other salted snack foods such as pretzels, chips, and salted popcorn. ¨ Frozen prepared meals, unless labeled low-sodium. ¨ Canned and dried soups, broths, and bouillon, unless labeled sodium-free or low-sodium. ¨ Canned vegetables, unless labeled sodium-free or low-sodium. ¨ [place] fries, pizza, tacos, and other fast foods. ¨ Pickles, olives, ketchup, and other condiments, especially soy sauce, unless labeled sodium-free or low-sodium. Where can you learn more? Go to http://steve-cassi.info/. Enter H941 in the search box to learn more about \"Low Sodium Diet (2,000 Milligram): Care Instructions. \" Current as of: March 29, 2018 Content Version: 11.8 © 5826-7194 [employer]. Care instructions adapted under license by [employer] (which disclaims liability or warranty for this information).  If you have questions about a medical condition or this instruction, always ask your healthcare professional. [place] [address] any warranty or liability for your use of this information. Introducing [facility] SERVICES! [employer] introduces [employer] patient portal. Now you can access parts of your medical record, email your doctor's office, and request medication refills online. 1. In your internet browser, go to [email or web address] 2. Click on the First Time User? Click Here link in the Sign In box. You will see the New Member Sign Up page. 3. Enter your [employer] Access Code exactly as it appears below. You will not need to use this code after youve completed the sign-up process. If you do not sign up before the expiration date, you must request a new code. · [employer] Access Code: [email or web address] Expires: 1/1/2019  4:15 PM 
 
4. Enter the last four digits of your Social Security Number (xxxx) and Date of Birth (mm/dd/yyyy) as indicated and click Submit. You will be taken to the next sign-up page. 5. Create a [employer] ID. This will be your [employer] login ID and cannot be changed, so think of one that is secure and easy to remember. 6. Create a [employer] password. You can change your password at any time. 7. Enter your Password Reset Question and Answer. This can be used at a later time if you forget your password. 8. Enter your e-mail address. You will receive e-mail notification when new information is available in [address] 9. Click Sign Up. You can now view and download portions of your medical record. 10. Click the Download Summary menu link to download a portable copy of your medical information. If you have questions, please visit the Frequently Asked Questions section of the [employer] website. Remember, [employer] is NOT to be used for urgent needs. For medical emergencies, dial 911. Now available from your iPhone and Android! Please provide this summary of care documentation to your next provider. Your primary care clinician is listed as Toyin Avendaño. If you have any questions after today's visit, please call [phone].

## 2018-10-03 NOTE — PROGRESS NOTES
Chief Complaint   Patient presents with    Medication Evaluation     Reviewed record in preparation for visit and have obtained necessary documentation. Identified pt with two pt identifiers(name and ). Health Maintenance Due   Topic    Shingrix Vaccine Age 49> (1 of 2)         Chief Complaint   Patient presents with    Medication Evaluation        Wt Readings from Last 3 Encounters:   10/03/18 127 lb 14.4 oz (58 kg)   18 129 lb 8 oz (58.7 kg)   10/16/17 131 lb 12.8 oz (59.8 kg)     Temp Readings from Last 3 Encounters:   10/03/18 98.7 °F (37.1 °C) (Oral)   18 98.8 °F (37.1 °C) (Oral)   10/16/17 97.7 °F (36.5 °C) (Oral)     BP Readings from Last 3 Encounters:   10/03/18 130/70   18 130/60   10/16/17 136/80     Pulse Readings from Last 3 Encounters:   10/03/18 84   18 89   10/16/17 75           Learning Assessment:  :     Learning Assessment 2015   PRIMARY LEARNER Patient Patient   HIGHEST LEVEL OF EDUCATION - PRIMARY LEARNER  GRADUATED HIGH SCHOOL OR GED GRADUATED HIGH SCHOOL OR GED   BARRIERS PRIMARY LEARNER NONE NONE   CO-LEARNER CAREGIVER No No   PRIMARY LANGUAGE ENGLISH ENGLISH    NEED No No   LEARNER PREFERENCE PRIMARY DEMONSTRATION DEMONSTRATION   LEARNING SPECIAL TOPICS no no   ANSWERED BY patient patient   RELATIONSHIP SELF SELF       Depression Screening:  :     PHQ over the last two weeks 10/3/2018   Little interest or pleasure in doing things Not at all   Feeling down, depressed, irritable, or hopeless Not at all   Total Score PHQ 2 0       Fall Risk Assessment:  :     Fall Risk Assessment, last 12 mths 10/3/2018   Able to walk? Yes   Fall in past 12 months? No   Fall with injury? -   Number of falls in past 12 months -   Fall Risk Score -       Abuse Screening:  :     Abuse Screening Questionnaire 2015   Do you ever feel afraid of your partner?  N N   Are you in a relationship with someone who physically or mentally threatens you? N N   Is it safe for you to go home? Y Y       Coordination of Care Questionnaire:  :     1) Have you been to an emergency room, urgent care clinic since your last visit? no   Hospitalized since your last visit? no             2) Have you seen or consulted any other health care providers outside of [address] [place] [address] since your last visit? no  (Include any pap smears or colon screenings in this section.)    3) Do you have an Advance Directive on file? no    4) Are you interested in receiving information on Advance Directives? NO      Patient is accompanied by self I have received verbal consent from Hafsa Porter to discuss any/all medical information while they are present in the room. Reviewed record  In preparation for visit and have obtained necessary documentation.

## 2018-10-03 NOTE — PROGRESS NOTES
Subjective:     Chief Complaint   Patient presents with    Medication Evaluation     She  is a 80y.o. year old female who presents for evaluation. Has some mucus in throat. Arthritis bothers off and on. Some weakness with it. Historical Data    Past Medical History:   Diagnosis Date    Hypercholesterolemia     Hypertension     Thyroid disease     hypothyroid        Past Surgical History:   Procedure Laterality Date    ENDOSCOPY, COLON, DIAGNOSTIC  2001    HX BACK SURGERY  1984    HX BREAST BIOPSY  2010    duct removal    HX DILATION AND CURETTAGE      HX HYSTERECTOMY  1980    +BSO    HX OPEN REDUCTION INTERNAL FIXATION  1996    L forearm    VASCULAR SURGERY PROCEDURE UNLIST  1960s    vein stripping-bilat        Outpatient Encounter Prescriptions as of 10/3/2018   Medication Sig Dispense Refill    losartan (COZAAR) 25 mg tablet TAKE ONE TABLET EVERY DAY 90 Tab 2    levothyroxine (SYNTHROID) 75 mcg tablet Take 1 tablet 6 days per week. 90 Tab 2    cholecalciferol (VITAMIN D3) 1,000 unit tablet Take 2,000 Units by mouth daily.  nabumetone (RELAFEN) 500 mg tablet Take 1 Tab by mouth two (2) times daily as needed for Pain. 60 Tab 2    VIT C/E/ZN/COPPR/LUTEIN/ZEAXAN (PRESERVISION AREDS 2 PO) Take  by mouth.  peg 400-propylene glycol (SYSTANE, PROPYLENE GLYCOL,) 0.4-0.3 % drop Administer  to left eye as needed.  acetaminophen (TYLENOL EXTRA STRENGTH) 500 mg tablet Take 1 Tab by mouth every eight (8) hours as needed for Pain. 1 Tab 0     No facility-administered encounter medications on file as of 10/3/2018.          Allergies   Allergen Reactions    Morphine Nausea and Vomiting     Did not relieve pain and couldn't sleep and caused vomiting      Cortisone Other (comments)     Injection-she had trouble walking after injection - worsening of movement        Social History     Social History    Marital status:      Spouse name: N/A    Number of children: N/A    Years of education: N/A     Occupational History    Not on file. Social History Main Topics    Smoking status: Never Smoker    Smokeless tobacco: Never Used    Alcohol use No    Drug use: No    Sexual activity: Not Currently     Other Topics Concern    Not on file     Social History Narrative    Ms. Jenny Ruby lives at [facility] in the independent living. Cares for her own apartment and manages her medications. Review of Systems  Pertinent items are noted in HPI. Objective:     Vitals:    10/03/18 1537   BP: 130/70   Pulse: 84   Resp: 16   Temp: 98.7 °F (37.1 °C)   TempSrc: Oral   SpO2: 98%   Weight: 127 lb 14.4 oz (58 kg)   Height: 5' 1\" (1.549 m)     Pleasant WF in no acute distress. Neck: Supple. No masses. Cardiac:  RRR without murmurs, gallops or rubs. Lungs: Clear to auscultation. Abdomen: Soft and non-tender. No masses. Extremities: No edema  Neuro: Intact    ASSESSMENT / PLAN:   1. Benign essential hypertension  · Low sodium diet. · Continue losartan. 2. Encounter for immunization    - varicella-zoster recombinant, PF, (SHINGRIX, PF,) 50 mcg/0.5 mL susr injection; 0.5 mL by IntraMUSCular route once for 1 dose. Indications: PREVENTION OF HERPES ZOSTER  Dispense: 0.5 mL; Refill: 0    3. Upper airway congestion  · Likely allergy related. · No sign of acute infection. Follow-up Disposition:  Return in about 6 months (around 4/3/2019) for F/U HTN. Advised her to call back or return to office if symptoms worsen/change/persist.  Discussed expected course/resolution/complications of diagnosis in detail with patient. Medication risks/benefits/costs/interactions/alternatives discussed with patient. She was given an after visit summary which includes diagnoses, current medications, & vitals. She expressed understanding with the diagnosis and plan.

## 2019-02-11 ENCOUNTER — OFFICE VISIT (OUTPATIENT)
Dept: INTERNAL MEDICINE CLINIC | Age: 84
End: 2019-02-11

## 2019-02-11 VITALS
WEIGHT: 129 LBS | TEMPERATURE: 97.7 F | HEIGHT: 61 IN | RESPIRATION RATE: 14 BRPM | SYSTOLIC BLOOD PRESSURE: 124 MMHG | OXYGEN SATURATION: 98 % | BODY MASS INDEX: 24.35 KG/M2 | HEART RATE: 86 BPM | DIASTOLIC BLOOD PRESSURE: 86 MMHG

## 2019-02-11 DIAGNOSIS — I10 BENIGN HYPERTENSION: Primary | ICD-10-CM

## 2019-02-11 DIAGNOSIS — E03.9 HYPOTHYROIDISM, ADULT: ICD-10-CM

## 2019-02-11 DIAGNOSIS — Z79.899 ENCOUNTER FOR LONG-TERM (CURRENT) USE OF MEDICATIONS: ICD-10-CM

## 2019-02-11 RX ORDER — NABUMETONE 500 MG/1
500 TABLET, FILM COATED ORAL
COMMUNITY
Start: 2019-01-23 | End: 2019-06-13 | Stop reason: SDUPTHER

## 2019-02-11 NOTE — PROGRESS NOTES
Patient's identity verified with two patient identifiers (name and date of birth). Reviewed record in preparation for visit and have obtained necessary documentation. 1. Have you been to the ER, urgent care clinic since your last visit? Hospitalized since your last visit? No  2. Have you seen or consulted any other health care providers outside of the [address] [place] [address] [place] since your last visit? Include any pap smears or colon screening. No    Chief Complaint   Patient presents with   Sh[employer] Drivers Dr. Jerald Song patient. Requesting handicap d/t chronic arthritic pain, worsening (back, knee, hip)- rx from ortho Relafen, takes PRN. Has never had Shingles vacccines.  Hypertension     6 month follow up, early (x2mos) but wanted to establish.  Cough with sputum     Chornic ~x3yrs, constant & worse in AM, yellow/green phlegm, sometimes causes her to be hoarse. Feels like needs to clear throat. No chest congestion/facial pain. Not fasting.     Health Maintenance Due   Topic Date Due    Shingrix Vaccine Age 49> (1 of 2)  Patient reports has not had. 12[birth year]18/1975       Wt Readings from Last 3 Encounters:   02/11/19 129 lb (58.5 kg)   10/03/18 127 lb 14.4 oz (58 kg)   04/16/18 129 lb 8 oz (58.7 kg)     Temp Readings from Last 3 Encounters:   02/11/19 97.7 °F (36.5 °C) (Oral)   10/03/18 98.7 °F (37.1 °C) (Oral)   04/16/18 98.8 °F (37.1 °C) (Oral)     BP Readings from Last 3 Encounters:   02/11/19 124/86   10/03/18 130/70   04/16/18 130/60     Pulse Readings from Last 3 Encounters:   02/11/19 86   10/03/18 84   04/16/18 89       Learning Assessment:  :     Learning Assessment 2/11/2019 7/6/2015 4/24/2014   PRIMARY LEARNER Patient Patient Patient   HIGHEST LEVEL OF EDUCATION - PRIMARY LEARNER  GRADUATED HIGH SCHOOL OR GED GRADUATED HIGH SCHOOL OR GED GRADUATED HIGH SCHOOL OR GED   BARRIERS PRIMARY LEARNER NONE NONE NONE   CO-LEARNER CAREGIVER No No No   PRIMARY LANGUAGE ENGLISH ENGLISH ENGLISH    NEED - No No   LEARNER PREFERENCE PRIMARY READING DEMONSTRATION DEMONSTRATION     LISTENING - -   LEARNING SPECIAL TOPICS - no no   ANSWERED BY patient patient patient   RELATIONSHIP SELF SELF SELF       Depression Screening:  :     PHQ over the last two weeks 2/11/2019   Little interest or pleasure in doing things Not at all   Feeling down, depressed, irritable, or hopeless Not at all   Total Score PHQ 2 0       Fall Risk Assessment:  :     Fall Risk Assessment, last 12 mths 2/11/2019   Able to walk? Yes   Fall in past 12 months? No   Fall with injury? -   Number of falls in past 12 months -   Fall Risk Score -       Abuse Screening:  :     Abuse Screening Questionnaire 2/11/2019 7/6/2015 4/24/2014   Do you ever feel afraid of your partner? N N N   Are you in a relationship with someone who physically or mentally threatens you? N N N   Is it safe for you to go home?  Jean Carlos Berkowitz

## 2019-02-11 NOTE — PROGRESS NOTES
Subjective:      Allyssa Erickson is a 80 y.o. female who presents today to become established. Prior PCP - Dr. Dar Braxton - 10/3/18    Active Medical Problems:  -hypertension  -hypothyroid  -OA - Dr. Asher Baugh. degenerative lumbar disease, right knee pain    Advanced Directive - has an established AD. She lives independently at [facility]. She still drives. Requesting DMV handicap placard due to her OA. States she needs it periodically when her pain flares. Her appetite is good. She states she likes to eat out. She denies any chest pain, shortness of breath, syncope, headaches, nausea/vomiting, or any bowel changes. Patient Active Problem List   Diagnosis Code    Colon polyp K63.5    Elevated cholesterol E78.00    Benign essential hypertension I10    Acquired hypothyroidism E03.9    Arthritis of both knees M17.0    TB (tuberculosis), treated A15.9    Vitamin D deficiency E55.9     Current Outpatient Medications   Medication Sig Dispense Refill    nabumetone (RELAFEN) 500 mg tablet Take 500 mg by mouth two (2) times daily as needed for Pain (arthritis; takes either once daily or BID prn, uses for flares).  losartan (COZAAR) 25 mg tablet TAKE ONE TABLET EVERY DAY 90 Tab 2    levothyroxine (SYNTHROID) 75 mcg tablet Take 1 tablet 6 days per week. 90 Tab 2    cholecalciferol (VITAMIN D3) 1,000 unit tablet Take 2,000 Units by mouth daily. Review of Systems    Pertinent items are noted in HPI.      Objective:     Visit Vitals  /86 (BP 1 Location: Left arm, BP Patient Position: Sitting)   Pulse 86   Temp 97.7 °F (36.5 °C) (Oral)   Resp 14   Ht 5' 1\" (1.549 m)   Wt 129 lb (58.5 kg)   SpO2 98%   BMI 24.37 kg/m²     General appearance: alert, cooperative, no distress, appears stated age  Head: Normocephalic, without obvious abnormality, atraumatic  Neck: supple, symmetrical, trachea midline, no adenopathy, no carotid bruit and no JVD  Lungs: clear to auscultation bilaterally  Heart: regular rate and rhythm, S1, S2 normal, no murmur, click, rub or gallop  Abdomen: soft, non-tender. Bowel sounds normal. No masses,  no organomegaly  Extremities: no edema    Assessment/Plan:     1. Benign hypertension  -I evaluated and recommended to continue current doses of medications. - CBC WITH AUTOMATED DIFF  - METABOLIC PANEL, COMPREHENSIVE  - UA/M W/RFLX CULTURE, ROUTINE    2. Hypothyroidism, adult  -clinically euthyroid  -due for labs at this time.     - TSH 3RD GENERATION  - T4, FREE    3. Encounter for long-term (current) use of medications    - CBC WITH AUTOMATED DIFF  - METABOLIC PANEL, COMPREHENSIVE  - TSH 3RD GENERATION  - T4, FREE  - UA/M W/RFLX CULTURE, ROUTINE     4. Advanced OA-  -DMV form completed for patient. Orders Placed This Encounter    CBC WITH AUTOMATED DIFF    METABOLIC PANEL, COMPREHENSIVE    TSH 3RD GENERATION    T4, FREE    UA/M W/RFLX CULTURE, ROUTINE       Follow-up Disposition:     Follow up in 6 months     Return if symptoms worsen or fail to improve. Advised patient to call back or return to office if symptoms worsen/change/persist.     Discussed expected course/resolution/complications of diagnosis in detail with patient. Medication risks/benefits/costs/interactions/alternatives discussed with patient. Patient was given an after visit summary which includes diagnoses, current medications, & vitals. Patient expressed understanding with the diagnosis and plan.

## 2019-02-15 ENCOUNTER — TELEPHONE (OUTPATIENT)
Dept: INTERNAL MEDICINE CLINIC | Age: 84
End: 2019-02-15

## 2019-02-15 LAB
ALBUMIN SERPL-MCNC: 4.6 G/DL (ref 3.2–4.6)
ALBUMIN/GLOB SERPL: 1.8 {RATIO} (ref 1.2–2.2)
ALP SERPL-CCNC: 74 IU/L (ref 39–117)
ALT SERPL-CCNC: 14 IU/L (ref 0–32)
APPEARANCE UR: CLEAR
AST SERPL-CCNC: 20 IU/L (ref 0–40)
BACTERIA #/AREA URNS HPF: ABNORMAL /[HPF]
BACTERIA UR CULT: ABNORMAL
BASOPHILS # BLD AUTO: 0 X10E3/UL (ref 0–0.2)
BASOPHILS NFR BLD AUTO: 0 %
BILIRUB SERPL-MCNC: 0.9 MG/DL (ref 0–1.2)
BILIRUB UR QL STRIP: NEGATIVE
BUN SERPL-MCNC: 25 MG/DL (ref 10–36)
BUN/CREAT SERPL: 28 (ref 12–28)
CALCIUM SERPL-MCNC: 9.5 MG/DL (ref 8.7–10.3)
CASTS URNS QL MICRO: ABNORMAL /LPF
CHLORIDE SERPL-SCNC: 101 MMOL/L (ref 96–106)
CO2 SERPL-SCNC: 26 MMOL/L (ref 20–29)
COLOR UR: YELLOW
CREAT SERPL-MCNC: 0.88 MG/DL (ref 0.57–1)
EOSINOPHIL # BLD AUTO: 0.2 X10E3/UL (ref 0–0.4)
EOSINOPHIL NFR BLD AUTO: 2 %
EPI CELLS #/AREA URNS HPF: ABNORMAL /HPF
ERYTHROCYTE [DISTWIDTH] IN BLOOD BY AUTOMATED COUNT: 14.5 % (ref 12.3–15.4)
GLOBULIN SER CALC-MCNC: 2.5 G/DL (ref 1.5–4.5)
GLUCOSE SERPL-MCNC: 88 MG/DL (ref 65–99)
GLUCOSE UR QL: NEGATIVE
HCT VFR BLD AUTO: 36.6 % (ref 34–46.6)
HGB BLD-MCNC: 12 G/DL (ref 11.1–15.9)
HGB UR QL STRIP: NEGATIVE
IMM GRANULOCYTES # BLD AUTO: 0 X10E3/UL (ref 0–0.1)
IMM GRANULOCYTES NFR BLD AUTO: 0 %
INTERPRETATION: NORMAL
KETONES UR QL STRIP: NEGATIVE
LEUKOCYTE ESTERASE UR QL STRIP: ABNORMAL
LYMPHOCYTES # BLD AUTO: 2.3 X10E3/UL (ref 0.7–3.1)
LYMPHOCYTES NFR BLD AUTO: 35 %
MCH RBC QN AUTO: 29.6 PG (ref 26.6–33)
MCHC RBC AUTO-ENTMCNC: 32.8 G/DL (ref 31.5–35.7)
MCV RBC AUTO: 90 FL (ref 79–97)
MICRO URNS: ABNORMAL
MONOCYTES # BLD AUTO: 0.9 X10E3/UL (ref 0.1–0.9)
MONOCYTES NFR BLD AUTO: 13 %
MUCOUS THREADS URNS QL MICRO: PRESENT
NEUTROPHILS # BLD AUTO: 3.3 X10E3/UL (ref 1.4–7)
NEUTROPHILS NFR BLD AUTO: 50 %
NITRITE UR QL STRIP: NEGATIVE
PH UR STRIP: 7 [PH] (ref 5–7.5)
PLATELET # BLD AUTO: 219 X10E3/UL (ref 150–379)
POTASSIUM SERPL-SCNC: 4.2 MMOL/L (ref 3.5–5.2)
PROT SERPL-MCNC: 7.1 G/DL (ref 6–8.5)
PROT UR QL STRIP: NEGATIVE
RBC # BLD AUTO: 4.06 X10E6/UL (ref 3.77–5.28)
RBC #/AREA URNS HPF: ABNORMAL /HPF
SODIUM SERPL-SCNC: 143 MMOL/L (ref 134–144)
SP GR UR: 1.01 (ref 1–1.03)
T4 FREE SERPL-MCNC: 1.6 NG/DL (ref 0.82–1.77)
TSH SERPL DL<=0.005 MIU/L-ACNC: 1.73 UIU/ML (ref 0.45–4.5)
URINALYSIS REFLEX, 377202: ABNORMAL
UROBILINOGEN UR STRIP-MCNC: 0.2 MG/DL (ref 0.2–1)
WBC # BLD AUTO: 6.6 X10E3/UL (ref 3.4–10.8)
WBC #/AREA URNS HPF: ABNORMAL /HPF

## 2019-02-15 RX ORDER — NITROFURANTOIN 25; 75 MG/1; MG/1
100 CAPSULE ORAL 2 TIMES DAILY
Qty: 6 CAP | Refills: 0 | Status: SHIPPED | OUTPATIENT
Start: 2019-02-15 | End: 2019-02-18

## 2019-02-15 NOTE — TELEPHONE ENCOUNTER
Patient notified that her ua showed e.coli uti. She started yesterday to have some urgency and hesitancy and burning with urination. No blood. Plan:  Give macrobid 100mg bid for 3 days. Patient states understanding and agrees with plan. Orders Placed This Encounter    nitrofurantoin, macrocrystal-monohydrate, (MACROBID) 100 mg capsule     Sig: Take 1 Cap by mouth two (2) times a day for 3 days.      Dispense:  6 Cap     Refill:  0

## 2019-03-28 DIAGNOSIS — E03.9 ACQUIRED HYPOTHYROIDISM: ICD-10-CM

## 2019-03-28 RX ORDER — LEVOTHYROXINE SODIUM 75 UG/1
TABLET ORAL
Qty: 90 TAB | Refills: 1 | Status: SHIPPED | OUTPATIENT
Start: 2019-03-28 | End: 2019-08-19 | Stop reason: SDUPTHER

## 2019-05-07 ENCOUNTER — OFFICE VISIT (OUTPATIENT)
Dept: INTERNAL MEDICINE CLINIC | Age: 84
End: 2019-05-07

## 2019-05-07 VITALS
HEART RATE: 103 BPM | TEMPERATURE: 100.6 F | HEIGHT: 61 IN | SYSTOLIC BLOOD PRESSURE: 120 MMHG | RESPIRATION RATE: 16 BRPM | OXYGEN SATURATION: 97 % | BODY MASS INDEX: 23.81 KG/M2 | WEIGHT: 126.1 LBS | DIASTOLIC BLOOD PRESSURE: 60 MMHG

## 2019-05-07 DIAGNOSIS — J06.9 UPPER RESPIRATORY TRACT INFECTION, UNSPECIFIED TYPE: ICD-10-CM

## 2019-05-07 DIAGNOSIS — R50.9 FEVER, UNSPECIFIED FEVER CAUSE: Primary | ICD-10-CM

## 2019-05-07 LAB
BILIRUB UR QL STRIP: NEGATIVE
GLUCOSE UR-MCNC: NEGATIVE MG/DL
KETONES P FAST UR STRIP-MCNC: NEGATIVE MG/DL
PH UR STRIP: 5.5 [PH] (ref 4.6–8)
PROT UR QL STRIP: NEGATIVE
SP GR UR STRIP: 1.01 (ref 1–1.03)
UA UROBILINOGEN AMB POC: NORMAL (ref 0.2–1)
URINALYSIS CLARITY POC: CLEAR
URINALYSIS COLOR POC: YELLOW
URINE BLOOD POC: NORMAL
URINE LEUKOCYTES POC: NEGATIVE
URINE NITRITES POC: NEGATIVE

## 2019-05-07 RX ORDER — AZITHROMYCIN 250 MG/1
250 TABLET, FILM COATED ORAL SEE ADMIN INSTRUCTIONS
Qty: 6 TAB | Refills: 0 | Status: SHIPPED | OUTPATIENT
Start: 2019-05-07 | End: 2019-05-12

## 2019-05-07 NOTE — PROGRESS NOTES
HISTORY OF PRESENT ILLNESS  Luis Alberto England is a 80 y.o. female. Patient reports cough, right ear pain, sore throat, fatigue, and fever over the past week. Fever just developed in the past 2 days. Cough is productive at times with purulent sputum. She also notes intermittent left rib cage pain, no rash. No pain in the area today, but has experienced intermittent pain over the past few days. Doesn't always seem to be triggered by the cough. Denies urinary symptoms. Visit Vitals  /60 (BP 1 Location: Left arm, BP Patient Position: Sitting)   Pulse (!) 103   Temp (!) 100.6 °F (38.1 °C) (Oral)   Resp 16   Ht 5' 1\" (1.549 m)   Wt 126 lb 1.6 oz (57.2 kg)   SpO2 97%   BMI 23.83 kg/m²       HPI    Review of Systems   Constitutional: Positive for fever and malaise/fatigue. HENT: Positive for congestion and ear pain. Respiratory: Positive for cough. Physical Exam   Constitutional: She appears well-developed. HENT:   Head: Normocephalic. Right Ear: Hearing, tympanic membrane, external ear and ear canal normal.   Left Ear: Hearing, tympanic membrane, external ear and ear canal normal.   Nose: Mucosal edema and rhinorrhea present. Mouth/Throat: Uvula is midline and mucous membranes are normal. Posterior oropharyngeal erythema present. No oropharyngeal exudate. Neck: Normal range of motion. Neck supple. Cardiovascular: Normal rate, regular rhythm and normal heart sounds. Pulmonary/Chest: Effort normal and breath sounds normal.   Lymphadenopathy:     She has cervical adenopathy (mild). Neurological: She is alert. Skin: Skin is warm and dry. No rash noted. Psychiatric: She has a normal mood and affect.      Results for orders placed or performed in visit on 05/07/19   AMB POC URINALYSIS DIP STICK AUTO W/O MICRO   Result Value Ref Range    Color (UA POC) Yellow     Clarity (UA POC) Clear     Glucose (UA POC) Negative Negative    Bilirubin (UA POC) Negative Negative    Ketones (UA POC) Negative Negative    Specific gravity (UA POC) 1.015 1.001 - 1.035    Blood (UA POC) 3+ Negative    pH (UA POC) 5.5 4.6 - 8.0    Protein (UA POC) Negative Negative    Urobilinogen (UA POC) 0.2 mg/dL 0.2 - 1    Nitrites (UA POC) Negative Negative    Leukocyte esterase (UA POC) Negative Negative     ASSESSMENT and PLAN    ICD-10-CM ICD-9-CM    1. Fever, unspecified fever cause R50.9 780.60 AMB POC URINALYSIS DIP STICK AUTO W/O MICRO   2.  Upper respiratory tract infection, unspecified type J06.9 465.9      Orders Placed This Encounter    AMB POC URINALYSIS DIP STICK AUTO W/O MICRO    azithromycin (ZITHROMAX) 250 mg tablet   follow-up if rib pain returns or rash develops  Hydrate  Tylenol for fever

## 2019-05-07 NOTE — PROGRESS NOTES
Chief Complaint   Patient presents with    Ear Pain     right    Sore Throat    Cough     Reviewed record in preparation for visit and have obtained necessary documentation. Identified pt with two pt identifiers(name and ). Health Maintenance Due   Topic    MEDICARE YEARLY EXAM          Chief Complaint   Patient presents with    Ear Pain     right    Sore Throat    Cough        Wt Readings from Last 3 Encounters:   19 126 lb 1.6 oz (57.2 kg)   19 129 lb (58.5 kg)   10/03/18 127 lb 14.4 oz (58 kg)     Temp Readings from Last 3 Encounters:   19 (!) 100.6 °F (38.1 °C) (Oral)   19 97.7 °F (36.5 °C) (Oral)   10/03/18 98.7 °F (37.1 °C) (Oral)     BP Readings from Last 3 Encounters:   19 120/60   19 124/86   10/03/18 130/70     Pulse Readings from Last 3 Encounters:   19 (!) 103   19 86   10/03/18 84           Learning Assessment:  :     Learning Assessment 2019   PRIMARY LEARNER Patient Patient Patient   HIGHEST LEVEL OF EDUCATION - PRIMARY LEARNER  GRADUATED HIGH SCHOOL OR GED GRADUATED HIGH SCHOOL OR GED GRADUATED HIGH SCHOOL OR GED   BARRIERS PRIMARY LEARNER NONE NONE NONE   CO-LEARNER CAREGIVER No No No   PRIMARY LANGUAGE ENGLISH ENGLISH ENGLISH    NEED - No No   LEARNER PREFERENCE PRIMARY READING DEMONSTRATION DEMONSTRATION     LISTENING - -   LEARNING SPECIAL TOPICS - no no   ANSWERED BY patient patient patient   RELATIONSHIP SELF SELF SELF       Depression Screening:  :     3 most recent PHQ Screens 2019   Little interest or pleasure in doing things Not at all   Feeling down, depressed, irritable, or hopeless Not at all   Total Score PHQ 2 0       Fall Risk Assessment:  :     Fall Risk Assessment, last 12 mths 2019   Able to walk? Yes   Fall in past 12 months?  No   Fall with injury? -   Number of falls in past 12 months -   Fall Risk Score -       Abuse Screening:  :     Abuse Screening Questionnaire 2/11/2019 7/6/2015 4/24/2014   Do you ever feel afraid of your partner? N N N   Are you in a relationship with someone who physically or mentally threatens you? N N N   Is it safe for you to go home? Y Y Y       Coordination of Care Questionnaire:  :     1) Have you been to an emergency room, urgent care clinic since your last visit? no   Hospitalized since your last visit? no             2) Have you seen or consulted any other health care providers outside of [address] [place] [address] since your last visit? no  (Include any pap smears or colon screenings in this section.)    3) Do you have an Advance Directive on file? no    4) Are you interested in receiving information on Advance Directives? NO      Patient is accompanied by self I have received verbal consent from Denver Knapp to discuss any/all medical information while they are present in the room. Reviewed record  In preparation for visit and have obtained necessary documentation.

## 2019-05-09 ENCOUNTER — OFFICE VISIT (OUTPATIENT)
Dept: INTERNAL MEDICINE CLINIC | Age: 84
End: 2019-05-09

## 2019-05-09 ENCOUNTER — TELEPHONE (OUTPATIENT)
Dept: INTERNAL MEDICINE CLINIC | Age: 84
End: 2019-05-09

## 2019-05-09 VITALS
TEMPERATURE: 99.7 F | BODY MASS INDEX: 23.6 KG/M2 | WEIGHT: 125 LBS | RESPIRATION RATE: 18 BRPM | SYSTOLIC BLOOD PRESSURE: 116 MMHG | DIASTOLIC BLOOD PRESSURE: 68 MMHG | HEART RATE: 89 BPM | HEIGHT: 61 IN | OXYGEN SATURATION: 96 %

## 2019-05-09 DIAGNOSIS — R07.81 RIB PAIN ON LEFT SIDE: Primary | ICD-10-CM

## 2019-05-09 DIAGNOSIS — J06.9 UPPER RESPIRATORY TRACT INFECTION, UNSPECIFIED TYPE: ICD-10-CM

## 2019-05-09 NOTE — TELEPHONE ENCOUNTER
Pt was in on [id number] to see Cara Jang  Fever/uri  And she is having very intense side pain she is sched to see doug today and she wants to talk to some one you can call pt at [phone]

## 2019-05-09 NOTE — PROGRESS NOTES
81 yo female was seen here 2 days ago for URI with cough. She was having some pain in L chest then. She was placed on a Z-pack. She continued to have the pain in L chest, and wanted to come for evaluation of that. She actually feels better today. She wanted to be sure this didn't represent anything heart related. Also she has had pneumonia several times. PE: WNWD elderly WF   T - 99.7   BP - 116/68   Lungs - crackles in L>R lower lung fields   Mildly tender over L lower rib cage      Imp: Tender L ribs likely related to cough with URI   URI under tx    Plan: She will complete the antibiotic   Delsym for cough   Increase hydration and take Mucinex   She will call next week if still having discomfort and we'll order a Chest/rib XR  _______________________  Expected course of current diagnosed problem(s) as well as expected progression and possible complications, and desired follow up with provider are discussed with patient. Patient is encouraged to be back in touch with any questions or concerns. Patient expresses understanding of plan of care. Patient is given AVS which includes diagnoses, current medications, vitals.

## 2019-05-09 NOTE — TELEPHONE ENCOUNTER
Spoke with patient. Verified name and . Patient states that she is still having intermittent left rib cage patient. She states that the last time she was experiencing this pain sometime between midnight and 6 am this monring she rates pain 10/10. Patient states that there is some shortness of breath when she is having this pain, but denies any chest pain, or shortness of breath when she is not having this pain. Patient does not have arm pain/tingling, headaches. Patient states that she informed previous provider of this pain. Patient has scheduled an appointment to be evaluated at today.

## 2019-05-09 NOTE — PATIENT INSTRUCTIONS
Try to keep well hydrated to help keep the mucus thinner    Delsym is a good cough medicine    Mucinex 600 mg in blue box - take one twice a day to help loosen mucus

## 2019-05-09 NOTE — PROGRESS NOTES
Reviewed record in preparation for visit and have obtained necessary documentation. Identified pt with two pt identifiers(name and ). Health Maintenance Due   Topic    MEDICARE YEARLY EXAM          Chief Complaint   Patient presents with    Rib Pain     Left side rib pain , f/u visit from 2 days ago. . Patient  states no pain today but  pain very strong the last 2 days off and on. Wt Readings from Last 3 Encounters:   19 125 lb (56.7 kg)   19 126 lb 1.6 oz (57.2 kg)   19 129 lb (58.5 kg)     Temp Readings from Last 3 Encounters:   19 (!) 100.6 °F (38.1 °C) (Oral)   19 97.7 °F (36.5 °C) (Oral)   10/03/18 98.7 °F (37.1 °C) (Oral)     BP Readings from Last 3 Encounters:   19 120/60   19 124/86   10/03/18 130/70     Pulse Readings from Last 3 Encounters:   19 (!) 103   19 86   10/03/18 84           Learning Assessment:  :     Learning Assessment 2019   PRIMARY LEARNER Patient Patient Patient   HIGHEST LEVEL OF EDUCATION - PRIMARY LEARNER  GRADUATED HIGH SCHOOL OR GED GRADUATED HIGH SCHOOL OR GED GRADUATED HIGH SCHOOL OR GED   BARRIERS PRIMARY LEARNER NONE NONE NONE   CO-LEARNER CAREGIVER No No No   PRIMARY LANGUAGE ENGLISH ENGLISH ENGLISH    NEED - No No   LEARNER PREFERENCE PRIMARY READING DEMONSTRATION DEMONSTRATION     LISTENING - -   LEARNING SPECIAL TOPICS - no no   ANSWERED BY patient patient patient   RELATIONSHIP SELF SELF SELF       Depression Screening:  :     3 most recent PHQ Screens 2019   Little interest or pleasure in doing things Not at all   Feeling down, depressed, irritable, or hopeless Not at all   Total Score PHQ 2 0       Fall Risk Assessment:  :     Fall Risk Assessment, last 12 mths 2019   Able to walk? Yes   Fall in past 12 months?  No   Fall with injury? -   Number of falls in past 12 months -   Fall Risk Score -       Abuse Screening:  :     Abuse Screening Questionnaire 2/11/2019 7/6/2015 4/24/2014   Do you ever feel afraid of your partner? N N N   Are you in a relationship with someone who physically or mentally threatens you? N N N   Is it safe for you to go home? Y Y Y       Coordination of Care Questionnaire:  :     1) Have you been to an emergency room, urgent care clinic since your last visit? no   Hospitalized since your last visit? no             2) Have you seen or consulted any other health care providers outside of [facility] since your last visit? no  (Include any pap smears or colon screenings in this section.)    3) Do you have an Advance Directive on file? no    4) Are you interested in receiving information on Advance Directives? NO      Patient is accompanied by self I have received verbal consent from Shantelle Gary to discuss any/all medical information while they are present in the room.

## 2019-06-12 DIAGNOSIS — I10 BENIGN ESSENTIAL HYPERTENSION: ICD-10-CM

## 2019-06-12 RX ORDER — LOSARTAN POTASSIUM 25 MG/1
TABLET ORAL
Qty: 90 TAB | Refills: 1 | Status: SHIPPED | OUTPATIENT
Start: 2019-06-12 | End: 2019-08-19 | Stop reason: SDUPTHER

## 2019-06-16 RX ORDER — NABUMETONE 500 MG/1
500 TABLET, FILM COATED ORAL
Qty: 60 TAB | Refills: 1 | Status: SHIPPED | OUTPATIENT
Start: 2019-06-16 | End: 2019-07-16

## 2019-06-17 NOTE — TELEPHONE ENCOUNTER
Received Rx for Relafen printed by Dr. Albertine Dakin signed. Unsure why it couldn't be e-scribed as it is an NSAID. Hard script faxed to requested pharmacy, confirmation received.

## 2019-06-28 ENCOUNTER — TELEPHONE (OUTPATIENT)
Dept: INTERNAL MEDICINE CLINIC | Age: 84
End: 2019-06-28

## 2019-06-28 NOTE — TELEPHONE ENCOUNTER
Patient would like to know the name of a podiatrist that  would refer her to for some toe issues. PLease call her back at [phone]

## 2019-06-28 NOTE — TELEPHONE ENCOUNTER
Spoke with patient. Informed of Dr. Anand Concepcion recommendations. Patient had no further questions.

## 2019-08-19 ENCOUNTER — OFFICE VISIT (OUTPATIENT)
Dept: INTERNAL MEDICINE CLINIC | Age: 84
End: 2019-08-19

## 2019-08-19 VITALS
BODY MASS INDEX: 23.22 KG/M2 | HEIGHT: 61 IN | TEMPERATURE: 98.2 F | OXYGEN SATURATION: 96 % | HEART RATE: 86 BPM | WEIGHT: 123 LBS | DIASTOLIC BLOOD PRESSURE: 60 MMHG | RESPIRATION RATE: 16 BRPM | SYSTOLIC BLOOD PRESSURE: 138 MMHG

## 2019-08-19 DIAGNOSIS — Z00.00 MEDICARE ANNUAL WELLNESS VISIT, SUBSEQUENT: Primary | ICD-10-CM

## 2019-08-19 DIAGNOSIS — I10 BENIGN ESSENTIAL HYPERTENSION: ICD-10-CM

## 2019-08-19 DIAGNOSIS — Z13.31 SCREENING FOR DEPRESSION: ICD-10-CM

## 2019-08-19 DIAGNOSIS — Z79.899 ENCOUNTER FOR LONG-TERM (CURRENT) USE OF MEDICATIONS: ICD-10-CM

## 2019-08-19 DIAGNOSIS — E03.9 ACQUIRED HYPOTHYROIDISM: ICD-10-CM

## 2019-08-19 DIAGNOSIS — Z13.39 SCREENING FOR ALCOHOLISM: ICD-10-CM

## 2019-08-19 RX ORDER — LOSARTAN POTASSIUM 25 MG/1
TABLET ORAL
Qty: 90 TAB | Refills: 1 | Status: SHIPPED | OUTPATIENT
Start: 2019-08-19 | End: 2020-02-27

## 2019-08-19 RX ORDER — LEVOTHYROXINE SODIUM 75 UG/1
TABLET ORAL
Qty: 90 TAB | Refills: 1 | Status: SHIPPED | OUTPATIENT
Start: 2019-08-19 | End: 2020-05-01

## 2019-08-19 NOTE — PATIENT INSTRUCTIONS
Medicare Wellness Visit, Female     The best way to live healthy is to have a lifestyle where you eat a well-balanced diet, exercise regularly, limit alcohol use, and quit all forms of tobacco/nicotine, if applicable. Regular preventive services are another way to keep healthy. Preventive services (vaccines, screening tests, monitoring & exams) can help personalize your care plan, which helps you manage your own care. Screening tests can find health problems at the earliest stages, when they are easiest to treat. [facility] follows the current, evidence-based guidelines published by the [facility] ([facility] [[email or web address]]STF) when recommending preventive services for our patients. Because we follow these guidelines, sometimes recommendations change over time as research supports it. (For example, mammograms used to be recommended annually. Even though Medicare will still pay for an annual mammogram, the newer guidelines recommend a mammogram every two years for women of average risk.)  Of course, you and your doctor may decide to screen more often for some diseases, based on your risk and your health status. Preventive services for you include:  - Medicare offers their members a free annual wellness visit, which is time for you and your primary care provider to discuss and plan for your preventive service needs. Take advantage of this benefit every year!  -All adults over the age of 72 should receive the recommended pneumonia vaccines. Current USPSTF guidelines recommend a series of two vaccines for the best pneumonia protection.   -All adults should have a flu vaccine yearly and a tetanus vaccine every 10 years. All adults age 61 and older should receive a shingles vaccine once in their lifetime.    -A bone mass density test is recommended when a woman turns 65 to screen for osteoporosis. This test is only recommended one time, as a screening.  Some providers will use this same test as a disease monitoring tool if you already have osteoporosis. -All adults age 38-68 who are overweight should have a diabetes screening test once every three years.   -Other screening tests and preventive services for persons with diabetes include: an eye exam to screen for diabetic retinopathy, a kidney function test, a foot exam, and stricter control over your cholesterol.   -Cardiovascular screening for adults with routine risk involves an electrocardiogram (ECG) at intervals determined by your doctor.   -Colorectal cancer screenings should be done for adults age 54-65 with no increased risk factors for colorectal cancer. There are a number of acceptable methods of screening for this type of cancer. Each test has its own benefits and drawbacks. Discuss with your doctor what is most appropriate for you during your annual wellness visit. The different tests include: colonoscopy (considered the best screening method), a fecal occult blood test, a fecal DNA test, and sigmoidoscopy.   -Breast cancer screenings are recommended every other year for women of normal risk, age 54-69.  -Cervical cancer screenings for women over age 72 are only recommended with certain risk factors.   -All adults born between [facility] should be screened once for Hepatitis C.

## 2019-08-19 NOTE — PROGRESS NOTES
Verified name and birth date for privacy precautions. Chart reviewed in preparation for today's visit. Chief Complaint   Patient presents with    Hypertension     follow up, not fasting     Thyroid Problem          Health Maintenance Due   Topic    MEDICARE YEARLY EXAM     Influenza Age 5 to Adult          Wt Readings from Last 3 Encounters:   08/19/19 123 lb (55.8 kg)   05/09/19 125 lb (56.7 kg)   05/07/19 126 lb 1.6 oz (57.2 kg)     Temp Readings from Last 3 Encounters:   08/19/19 98.2 °F (36.8 °C) (Oral)   05/09/19 99.7 °F (37.6 °C) (Oral)   05/07/19 (!) 100.6 °F (38.1 °C) (Oral)     BP Readings from Last 3 Encounters:   08/19/19 138/60   05/09/19 116/68   05/07/19 120/60     Pulse Readings from Last 3 Encounters:   08/19/19 86   05/09/19 89   05/07/19 (!) 103         Learning Assessment:  :     Learning Assessment 2/11/2019 7/6/2015 4/24/2014   PRIMARY LEARNER Patient Patient Patient   HIGHEST LEVEL OF EDUCATION - PRIMARY LEARNER  GRADUATED HIGH SCHOOL OR GED GRADUATED HIGH SCHOOL OR GED GRADUATED HIGH SCHOOL OR GED   BARRIERS PRIMARY LEARNER NONE NONE NONE   CO-LEARNER CAREGIVER No No No   PRIMARY LANGUAGE ENGLISH ENGLISH ENGLISH    NEED - No No   LEARNER PREFERENCE PRIMARY READING DEMONSTRATION DEMONSTRATION     LISTENING - -   LEARNING SPECIAL TOPICS - no no   ANSWERED BY patient patient patient   RELATIONSHIP SELF SELF SELF       Depression Screening:  :     3 most recent PHQ Screens 5/9/2019   Little interest or pleasure in doing things Not at all   Feeling down, depressed, irritable, or hopeless Not at all   Total Score PHQ 2 0       Fall Risk Assessment:  :     Fall Risk Assessment, last 12 mths 5/9/2019   Able to walk? Yes   Fall in past 12 months? No   Fall with injury? -   Number of falls in past 12 months -   Fall Risk Score -       Abuse Screening:  :     Abuse Screening Questionnaire 2/11/2019 7/6/2015 4/24/2014   Do you ever feel afraid of your partner?  N N N   Are you in a relationship with someone who physically or mentally threatens you? N N N   Is it safe for you to go home? Y Y Y       Coordination of Care Questionnaire:  :     1) Have you been to an emergency room, urgent care clinic since your last visit? no   Hospitalized since your last visit? no             2) Have you seen or consulted any other health care providers outside of [address] [place] [address] since your last visit? no  (Include any pap smears or colon screenings in this section.)    3) Do you have an Advance Directive on file? no      Patient is currently accompanied by her self & I have received verbal consent from Pietro Hazel to discuss any/all medical information while he/she is present in the room.     ------------------------------------------------

## 2019-08-20 LAB
ALBUMIN SERPL-MCNC: 4.1 G/DL (ref 3.2–4.6)
ALBUMIN/GLOB SERPL: 1.9 {RATIO} (ref 1.2–2.2)
ALP SERPL-CCNC: 67 IU/L (ref 39–117)
ALT SERPL-CCNC: 12 IU/L (ref 0–32)
AST SERPL-CCNC: 19 IU/L (ref 0–40)
BILIRUB SERPL-MCNC: 0.8 MG/DL (ref 0–1.2)
BUN SERPL-MCNC: 19 MG/DL (ref 10–36)
BUN/CREAT SERPL: 25 (ref 12–28)
CALCIUM SERPL-MCNC: 9.2 MG/DL (ref 8.7–10.3)
CHLORIDE SERPL-SCNC: 103 MMOL/L (ref 96–106)
CO2 SERPL-SCNC: 26 MMOL/L (ref 20–29)
CREAT SERPL-MCNC: 0.75 MG/DL (ref 0.57–1)
GLOBULIN SER CALC-MCNC: 2.2 G/DL (ref 1.5–4.5)
GLUCOSE SERPL-MCNC: 83 MG/DL (ref 65–99)
POTASSIUM SERPL-SCNC: 4.1 MMOL/L (ref 3.5–5.2)
PROT SERPL-MCNC: 6.3 G/DL (ref 6–8.5)
SODIUM SERPL-SCNC: 142 MMOL/L (ref 134–144)
T4 FREE SERPL-MCNC: 1.87 NG/DL (ref 0.82–1.77)
TSH SERPL DL<=0.005 MIU/L-ACNC: 2.38 UIU/ML (ref 0.45–4.5)

## 2020-02-12 ENCOUNTER — TELEPHONE (OUTPATIENT)
Dept: INTERNAL MEDICINE CLINIC | Age: 85
End: 2020-02-12

## 2020-02-12 NOTE — TELEPHONE ENCOUNTER
[phone]      Please call the patient to updated if she needs to be on an antibiotic when she goes to the dentist

## 2020-02-13 NOTE — TELEPHONE ENCOUNTER
Reviewed pt chart with Dr. Sandra Carr and pt does NOT need an abx for dental appointments. Call to pt and LVM requesting she contact the office back to be advised of the above information.

## 2020-02-14 NOTE — TELEPHONE ENCOUNTER
----- Message from Jackson Waller sent at 2/13/2020  5:40 PM EST -----  Regarding: Dr. Min Doherty  Patient return call    Caller's first and last name and relationship (if not the patient):  pt    Best contact number(s):  [phone]    Whose call is being returned:   The nurse    Details to clarify the request:      Jackson Waller

## 2020-02-14 NOTE — TELEPHONE ENCOUNTER
Returned call to pt - verified self and birth date. Pt was informed she did not need an abx for dental procedures. She acknowledged understanding with no additional questions.

## 2020-02-27 DIAGNOSIS — I10 BENIGN ESSENTIAL HYPERTENSION: ICD-10-CM

## 2020-02-27 RX ORDER — LOSARTAN POTASSIUM 25 MG/1
TABLET ORAL
Qty: 90 TAB | Refills: 1 | Status: SHIPPED | OUTPATIENT
Start: 2020-02-27 | End: 2020-08-03 | Stop reason: DRUGHIGH

## 2020-03-16 ENCOUNTER — TELEPHONE (OUTPATIENT)
Dept: INTERNAL MEDICINE CLINIC | Age: 85
End: 2020-03-16

## 2020-03-16 NOTE — TELEPHONE ENCOUNTER
Spoke with patient. She has been having an issue with excessive mucus recently. Wants to know if she needs to stay home because of that. No fever, no recent traveling.  Advised to keep appointment

## 2020-03-16 NOTE — TELEPHONE ENCOUNTER
Laurie Hidalgo ([facility]) [phone] (H)     Pt is requesting a call back her appt for this wed and she wonders if she should keep that appt?

## 2020-07-23 ENCOUNTER — TELEPHONE (OUTPATIENT)
Dept: INTERNAL MEDICINE CLINIC | Age: 85
End: 2020-07-23

## 2020-07-23 NOTE — TELEPHONE ENCOUNTER
Patient has an appt scheduled for Aug 3rd, but is concerned about her b/p this morning -- it was 176/74. Does she need to be on a different med? Please call.

## 2020-07-23 NOTE — TELEPHONE ENCOUNTER
Pt stated having her bp taken for the first time in months today and it was 176/74. Pt is concerned because her bp has never been this high. The tech advised her to speak with her pcp and will recheck bp next week. Pt is scheduled to see you on 8/3; pls advise.

## 2020-07-27 NOTE — TELEPHONE ENCOUNTER
Pt was instructed to increase her Losartan from 25 mg to 50 mg daily until her upcoming visit with verbal cfm.

## 2020-08-03 ENCOUNTER — OFFICE VISIT (OUTPATIENT)
Dept: INTERNAL MEDICINE CLINIC | Age: 85
End: 2020-08-03
Payer: MEDICARE

## 2020-08-03 VITALS
OXYGEN SATURATION: 98 % | RESPIRATION RATE: 16 BRPM | BODY MASS INDEX: 23.24 KG/M2 | TEMPERATURE: 98.2 F | HEART RATE: 76 BPM | SYSTOLIC BLOOD PRESSURE: 140 MMHG | WEIGHT: 123 LBS | DIASTOLIC BLOOD PRESSURE: 60 MMHG

## 2020-08-03 DIAGNOSIS — Z13.31 SCREENING FOR DEPRESSION: ICD-10-CM

## 2020-08-03 DIAGNOSIS — Z00.00 MEDICARE ANNUAL WELLNESS VISIT, SUBSEQUENT: Primary | ICD-10-CM

## 2020-08-03 DIAGNOSIS — I10 BENIGN ESSENTIAL HYPERTENSION: ICD-10-CM

## 2020-08-03 DIAGNOSIS — Z13.39 SCREENING FOR ALCOHOLISM: ICD-10-CM

## 2020-08-03 DIAGNOSIS — E03.9 ACQUIRED HYPOTHYROIDISM: ICD-10-CM

## 2020-08-03 DIAGNOSIS — Z79.899 ENCOUNTER FOR LONG-TERM (CURRENT) USE OF MEDICATIONS: ICD-10-CM

## 2020-08-03 PROCEDURE — G8510 SCR DEP NEG, NO PLAN REQD: HCPCS | Performed by: INTERNAL MEDICINE

## 2020-08-03 PROCEDURE — G0444 DEPRESSION SCREEN ANNUAL: HCPCS | Performed by: INTERNAL MEDICINE

## 2020-08-03 PROCEDURE — 1090F PRES/ABSN URINE INCON ASSESS: CPT | Performed by: INTERNAL MEDICINE

## 2020-08-03 PROCEDURE — G8427 DOCREV CUR MEDS BY ELIG CLIN: HCPCS | Performed by: INTERNAL MEDICINE

## 2020-08-03 PROCEDURE — G8536 NO DOC ELDER MAL SCRN: HCPCS | Performed by: INTERNAL MEDICINE

## 2020-08-03 PROCEDURE — 99213 OFFICE O/P EST LOW 20 MIN: CPT | Performed by: INTERNAL MEDICINE

## 2020-08-03 PROCEDURE — G8420 CALC BMI NORM PARAMETERS: HCPCS | Performed by: INTERNAL MEDICINE

## 2020-08-03 PROCEDURE — G0439 PPPS, SUBSEQ VISIT: HCPCS | Performed by: INTERNAL MEDICINE

## 2020-08-03 PROCEDURE — 1101F PT FALLS ASSESS-DOCD LE1/YR: CPT | Performed by: INTERNAL MEDICINE

## 2020-08-03 RX ORDER — LOSARTAN POTASSIUM 50 MG/1
50 TABLET ORAL DAILY
Qty: 90 TAB | Refills: 1 | Status: SHIPPED | OUTPATIENT
Start: 2020-08-03 | End: 2021-02-08

## 2020-08-03 RX ORDER — NABUMETONE 500 MG/1
TABLET, FILM COATED ORAL
COMMUNITY
End: 2022-08-04 | Stop reason: ALTCHOICE

## 2020-08-03 NOTE — PROGRESS NOTES
Subjective:      Marcellus Nicholas is a 80 y.o. female who presents today for follow up of her hypertension and hyperlipidemia    She is also due for her medicare Wellness exam.     She lives independently at [facility]. Has been in quarantine. She states that she has been getting lazy due to the disruption of her routine. Her family is still not able to come visit. She is allowed to go to her medical appointments. Patient Active Problem List   Diagnosis Code    Colon polyp K63.5    Elevated cholesterol E78.00    Benign essential hypertension I10    Acquired hypothyroidism E03.9    Arthritis of both knees M17.0    TB (tuberculosis), treated A15.9    Vitamin D deficiency E55.9     Current Outpatient Medications   Medication Sig Dispense Refill    nabumetone (RELAFEN) 500 mg tablet Take  by mouth two (2) times daily as needed for Pain.  vit A/vit C/vit E/zinc/copper (ICAPS AREDS PO) Take  by mouth daily.  levothyroxine (SYNTHROID) 75 mcg tablet TAKE 1 TABLET ONCE A DAY ON 6 DAYS OF EACH WEEK. 90 Tab 0    losartan (COZAAR) 25 mg tablet TAKE ONE TABLET EVERY DAY (Patient taking differently: Take 50 mg by mouth daily.) 90 Tab 1    cholecalciferol (VITAMIN D3) 1,000 unit tablet Take 2,000 Units by mouth daily. Review of Systems    Pertinent items are noted in HPI. Objective:      Wt Readings from Last 3 Encounters:   08/03/20 123 lb (55.8 kg)   08/19/19 123 lb (55.8 kg)   05/09/19 125 lb (56.7 kg)     BP Readings from Last 3 Encounters:   08/03/20 140/60   08/19/19 138/60   05/09/19 116/68     Visit Vitals  /60 (BP 1 Location: Left arm, BP Patient Position: Sitting)   Pulse 76   Temp 98.2 °F (36.8 °C) (Temporal)   Resp 16   Wt 123 lb (55.8 kg)   SpO2 98%   BMI 23.24 kg/m²     General appearance: alert, cooperative, no distress, appears stated age  Head: Normocephalic, without obvious abnormality, atraumatic  Neck: supple, symmetrical, trachea midline, no adenopathy, no carotid bruit and no JVD  Lungs: clear to auscultation bilaterally  Heart: regular rate and rhythm, S1, S2 normal, no murmur, click, rub or gallop  Abdomen: soft, non-tender. Bowel sounds normal. No masses,  no organomegaly  Extremities: extremities normal, atraumatic, no cyanosis or edema    Assessment/Plan:     1. Acquired hypothyroidism  -clinically euthyroid.  -compliant with use of levothyroxine.     - TSH 3RD GENERATION  - T4, FREE    2. Benign essential hypertension  -I evaluated and recommended to continue current doses of medications. - CBC WITH AUTOMATED DIFF  - METABOLIC PANEL, COMPREHENSIVE    3. Encounter for long-term (current) use of medications      4. Medicare annual wellness visit, subsequent  -completed today. - DEPRESSION SCREEN ANNUAL    5. Screening for alcoholism      6. Screening for depression    - DEPRESSION SCREEN ANNUAL     Follow-up Disposition:     Follow up in 6 months     Return if symptoms worsen or fail to improve. Advised patient to call back or return to office if symptoms worsen/change/persist.     Discussed expected course/resolution/complications of diagnosis in detail with patient. Medication risks/benefits/costs/interactions/alternatives discussed with patient. Patient was given an after visit summary which includes diagnoses, current medications, & vitals. Patient expressed understanding with the diagnosis and plan. This is the Subsequent Medicare Annual Wellness Exam, performed 12 months or more after the Initial AWV or the last Subsequent AWV    I have reviewed the patient's medical history in detail and updated the computerized patient record.      History     Patient Active Problem List   Diagnosis Code    Colon polyp K63.5    Elevated cholesterol E78.00    Benign essential hypertension I10    Acquired hypothyroidism E03.9    Arthritis of both knees M17.0    TB (tuberculosis), treated A15.9    Vitamin D deficiency E55.9     Past Medical History: Diagnosis Date    Hypercholesterolemia     Hypertension     Thyroid disease     hypothyroid      Past Surgical History:   Procedure Laterality Date    ENDOSCOPY, COLON, DIAGNOSTIC  2001    HX BACK SURGERY  1984    HX BREAST BIOPSY  2010    duct removal, benign    HX DILATION AND CURETTAGE      HX HYSTERECTOMY  1980 +BSO    HX OPEN REDUCTION INTERNAL FIXATION  1996    L forearm    VASCULAR SURGERY PROCEDURE UNLIST  1960s    vein stripping-bilat     Current Outpatient Medications   Medication Sig Dispense Refill    nabumetone (RELAFEN) 500 mg tablet Take  by mouth two (2) times daily as needed for Pain.  vit A/vit C/vit E/zinc/copper (ICAPS AREDS PO) Take  by mouth daily.  levothyroxine (SYNTHROID) 75 mcg tablet TAKE 1 TABLET ONCE A DAY ON 6 DAYS OF EACH WEEK. 90 Tab 0    losartan (COZAAR) 25 mg tablet TAKE ONE TABLET EVERY DAY (Patient taking differently: Take 50 mg by mouth daily.) 90 Tab 1    cholecalciferol (VITAMIN D3) 1,000 unit tablet Take 2,000 Units by mouth daily.        Allergies   Allergen Reactions    Morphine Nausea and Vomiting     Did not relieve pain and couldn't sleep and caused vomiting      Cortisone Other (comments)     Injection-she had trouble walking after injection - worsening of movement       Family History   Problem Relation Age of Onset    Cancer Father         liver    Colon Cancer Mother     Cancer Mother         colon    Cancer Sister         liver    Cancer Brother         liver    Colon Polyps Sister     Thyroid Cancer Daughter      Social History     Tobacco Use    Smoking status: Never Smoker    Smokeless tobacco: Never Used   Substance Use Topics    Alcohol use: No       Depression Risk Factor Screening:     3 most recent PHQ Screens 8/3/2020   Little interest or pleasure in doing things Not at all   Feeling down, depressed, irritable, or hopeless Not at all   Total Score PHQ 2 0       Alcohol Risk Factor Screening:   Do you average 1 drink per night or more than 7 drinks a week:  No    On any one occasion in the past three months have you have had more than 3 drinks containing alcohol:  No      Functional Ability and Level of Safety:   Hearing: Hearing is good. Activities of Daily Living: The home contains: handrails and grab bars  Patient does total self care     Ambulation: with no difficulty     Fall Risk:  Fall Risk Assessment, last 12 mths 8/3/2020   Able to walk? Yes   Fall in past 12 months? No   Fall with injury? -   Number of falls in past 12 months -   Fall Risk Score -     Abuse Screen:  Patient is not abused       Cognitive Screening   Has your family/caregiver stated any concerns about your memory: no         Patient Care Team   Patient Care Team:  Neli Santa MD as PCP - General (Internal Medicine)  Neli Santa MD as PCP [facility]  Susan Olmos MD as Physician (Orthopedic Surgery)  Oscar Flores MD as Physician (Ophthalmology)    Assessment/Plan   Education and counseling provided:  Are appropriate based on today's review and evaluation    Diagnoses and all orders for this visit:    1. Medicare annual wellness visit, subsequent  -     Ryan Messer    2. Acquired hypothyroidism  -     TSH 3RD GENERATION  -     T4, FREE    3. Benign essential hypertension  -     CBC WITH AUTOMATED DIFF  -     METABOLIC PANEL, COMPREHENSIVE    4. Encounter for long-term (current) use of medications    5. Screening for alcoholism    6.  Screening for depression  -     [place] Maintenance Due   Topic Date Due    Influenza Age 5 to Adult  08/01/2020

## 2020-08-03 NOTE — PATIENT INSTRUCTIONS
Medicare Wellness Visit, Female     The best way to live healthy is to have a lifestyle where you eat a well-balanced diet, exercise regularly, limit alcohol use, and quit all forms of tobacco/nicotine, if applicable. Regular preventive services are another way to keep healthy. Preventive services (vaccines, screening tests, monitoring & exams) can help personalize your care plan, which helps you manage your own care. Screening tests can find health problems at the earliest stages, when they are easiest to treat. [address] follows the current, evidence-based guidelines published by the [facility] Jourdan Oli ([facility]) when recommending preventive services for our patients. Because we follow these guidelines, sometimes recommendations change over time as research supports it. (For example, mammograms used to be recommended annually. Even though Medicare will still pay for an annual mammogram, the newer guidelines recommend a mammogram every two years for women of average risk.)  Of course, you and your doctor may decide to screen more often for some diseases, based on your risk and your health status. Preventive services for you include:  - Medicare offers their members a free annual wellness visit, which is time for you and your primary care provider to discuss and plan for your preventive service needs. Take advantage of this benefit every year!  -All adults over the age of 72 should receive the recommended pneumonia vaccines. Current USPSTF guidelines recommend a series of two vaccines for the best pneumonia protection.   -All adults should have a flu vaccine yearly and a tetanus vaccine every 10 years. All adults age 48 and older should receive a shingles vaccine once in their lifetime.    -A bone mass density test is recommended when a woman turns 65 to screen for osteoporosis. This test is only recommended one time, as a screening.  Some providers will use this same test as a disease monitoring tool if you already have osteoporosis. -All adults age 38-68 who are overweight should have a diabetes screening test once every three years.   -Other screening tests and preventive services for persons with diabetes include: an eye exam to screen for diabetic retinopathy, a kidney function test, a foot exam, and stricter control over your cholesterol.   -Cardiovascular screening for adults with routine risk involves an electrocardiogram (ECG) at intervals determined by your doctor.   -Colorectal cancer screenings should be done for adults age 54-65 with no increased risk factors for colorectal cancer. There are a number of acceptable methods of screening for this type of cancer. Each test has its own benefits and drawbacks. Discuss with your doctor what is most appropriate for you during your annual wellness visit. The different tests include: colonoscopy (considered the best screening method), a fecal occult blood test, a fecal DNA test, and sigmoidoscopy. -Breast cancer screenings are recommended every other year for women of normal risk, age 54-69.  -Cervical cancer screenings for women over age 72 are only recommended with certain risk factors.   -All adults born between [facility] should be screened once for Hepatitis C. Here is a list of your current Health Maintenance items (your personalized list of preventive services) with a due date:  Health Maintenance Due   Topic Date Due    Flu Vaccine  08/01/2020         Medicare Wellness Visit, Female     The best way to live healthy is to have a lifestyle where you eat a well-balanced diet, exercise regularly, limit alcohol use, and quit all forms of tobacco/nicotine, if applicable. Regular preventive services are another way to keep healthy. Preventive services (vaccines, screening tests, monitoring & exams) can help personalize your care plan, which helps you manage your own care.  Screening tests can find health problems at [de-identified] : Right Foot Physical Examination:\par \par General: Alert and oriented x3.  In no acute distress.  Pleasant in nature with a normal affect.  No apparent respiratory distress. \par Erythema, Warmth, Rubor: Negative\par Swelling: Positive laterally.\par \par ROM Ankle:\par 1. Dorsiflexion: 10 degrees\par 2. Plantarflexion: 40 degrees\par 3. Inversion: 20 degrees\par 4. Eversion: 10 degrees\par \par ROM of digits: Normal\par \par Pes Planus: Negative\par Pes Cavus: Negative\par \par Bunion: Negative\par Octavia's Bunion (Bunionette): Negative\par Hammer Toe Deformity/Deformities: Negative\par \par Tenderness to Palpation: \par 1. Heel Pain: Negative\par 2. Midfoot Pain: Negative\par 3. First MTP Joint: Negative\par 4. Lis Franc Joint: Negative\par \par Tenderness Metatarsals:\par 1st MT: Negative\par 2nd MT: Negative\par 3rd MT: Negative\par 4th MT: Negative\par 5th MT: Positive\par Base of the 5th MT: Negative\par \par Ligament Pain:\par 1. Lis Franc Ligament: Negative\par 2. Plantar Fascia Ligament: Negative\par \par Strength: \par 5/5 TA/GS/EHL/FHL/EDL/ADD/ABD\par \par Pulses: 2+ DP/PT Pulses\par \par Capillary Refill Toes: <2 seconds\par \par Neuro: Intact motor and sensory throughout\par \par Additional Test:\par 1. Keene's Squeeze Test: Negative\par 2. Calcaneal Squeeze Test: Negative the earliest stages, when they are easiest to treat. [facility] follows the current, evidence-based guidelines published by the [facility] Jourdan Oli ([facility]) when recommending preventive services for our patients. Because we follow these guidelines, sometimes recommendations change over time as research supports it. (For example, mammograms used to be recommended annually. Even though Medicare will still pay for an annual mammogram, the newer guidelines recommend a mammogram every two years for women of average risk). Of course, you and your doctor may decide to screen more often for some diseases, based on your risk and your co-morbidities (chronic disease you are already diagnosed with). Preventive services for you include:  - Medicare offers their members a free annual wellness visit, which is time for you and your primary care provider to discuss and plan for your preventive service needs. Take advantage of this benefit every year!  -All adults over the age of 72 should receive the recommended pneumonia vaccines. Current USPSTF guidelines recommend a series of two vaccines for the best pneumonia protection.   -All adults should have a flu vaccine yearly and a tetanus vaccine every 10 years.   -All adults age 48 and older should receive the shingles vaccines (series of two vaccines).       -All adults age 38-68 who are overweight should have a diabetes screening test once every three years.   -All adults born between [birth year] and 1965 should be screened once for Hepatitis C.  -Other screening tests and preventive services for persons with diabetes include: an eye exam to screen for diabetic retinopathy, a kidney function test, a foot exam, and stricter control over your cholesterol.   -Cardiovascular screening for adults with routine risk involves an electrocardiogram (ECG) at intervals determined by your doctor.   -Colorectal cancer screenings should be done for [de-identified] : X-rays of the right foot taken in office today and reviewed with the patient showed: Nondisplaced obliquely oriented fifth metatarsal fracture. adults age 54-65 with no increased risk factors for colorectal cancer. There are a number of acceptable methods of screening for this type of cancer. Each test has its own benefits and drawbacks. Discuss with your doctor what is most appropriate for you during your annual wellness visit. The different tests include: colonoscopy (considered the best screening method), a fecal occult blood test, a fecal DNA test, and sigmoidoscopy.    -A bone mass density test is recommended when a woman turns 65 to screen for osteoporosis. This test is only recommended one time, as a screening. Some providers will use this same test as a disease monitoring tool if you already have osteoporosis. -Breast cancer screenings are recommended every other year for women of normal risk, age 54-69.  -Cervical cancer screenings for women over age 72 are only recommended with certain risk factors.      Here is a list of your current Health Maintenance items (your personalized list of preventive services) with a due date:  Health Maintenance Due   Topic Date Due    Flu Vaccine  08/01/2020       Delsym 2 tsp every 12 hours if needed for cough

## 2020-08-03 NOTE — PROGRESS NOTES
Verified name and birth date for privacy precautions. Chart reviewed in preparation for today's visit. Chief Complaint   Patient presents with    Hypertension          Health Maintenance Due   Topic    Shingrix Vaccine Age 49> (1 of 2)    Influenza Age 5 to Adult     Medicare Yearly Exam          Wt Readings from Last 3 Encounters:   08/03/20 123 lb (55.8 kg)   08/19/19 123 lb (55.8 kg)   05/09/19 125 lb (56.7 kg)     Temp Readings from Last 3 Encounters:   08/03/20 98.2 °F (36.8 °C) (Temporal)   08/19/19 98.2 °F (36.8 °C) (Oral)   05/09/19 99.7 °F (37.6 °C) (Oral)     BP Readings from Last 3 Encounters:   08/03/20 140/60   08/19/19 138/60   05/09/19 116/68     Pulse Readings from Last 3 Encounters:   08/03/20 76   08/19/19 86   05/09/19 89         Learning Assessment:  :     Learning Assessment 2/11/2019 7/6/2015 4/24/2014   PRIMARY LEARNER Patient Patient Patient   HIGHEST LEVEL OF EDUCATION - PRIMARY LEARNER  GRADUATED HIGH SCHOOL OR GED GRADUATED HIGH SCHOOL OR GED GRADUATED HIGH SCHOOL OR GED   BARRIERS PRIMARY LEARNER NONE NONE NONE   CO-LEARNER CAREGIVER No No No   PRIMARY LANGUAGE ENGLISH ENGLISH ENGLISH    NEED - No No   LEARNER PREFERENCE PRIMARY READING DEMONSTRATION DEMONSTRATION     LISTENING - -   LEARNING SPECIAL TOPICS - no no   ANSWERED BY patient patient patient   RELATIONSHIP SELF SELF SELF       Depression Screening:  :     3 most recent PHQ Screens 8/3/2020   Little interest or pleasure in doing things Not at all   Feeling down, depressed, irritable, or hopeless Not at all   Total Score PHQ 2 0       Fall Risk Assessment:  :     Fall Risk Assessment, last 12 mths 8/3/2020   Able to walk? Yes   Fall in past 12 months? No   Fall with injury? -   Number of falls in past 12 months -   Fall Risk Score -       Abuse Screening:  :     Abuse Screening Questionnaire 8/3/2020 2/11/2019 7/6/2015 4/24/2014   Do you ever feel afraid of your partner?  N N N N   Are you in a relationship with someone who physically or mentally threatens you? N N N N   Is it safe for you to go home?  Esdras Bernardo

## 2020-08-04 LAB
ALBUMIN SERPL-MCNC: 4.5 G/DL (ref 3.5–4.6)
ALBUMIN/GLOB SERPL: 2 {RATIO} (ref 1.2–2.2)
ALP SERPL-CCNC: 72 IU/L (ref 39–117)
ALT SERPL-CCNC: 12 IU/L (ref 0–32)
AST SERPL-CCNC: 22 IU/L (ref 0–40)
BASOPHILS # BLD AUTO: 0 X10E3/UL (ref 0–0.2)
BASOPHILS NFR BLD AUTO: 0 %
BILIRUB SERPL-MCNC: 0.7 MG/DL (ref 0–1.2)
BUN SERPL-MCNC: 23 MG/DL (ref 10–36)
BUN/CREAT SERPL: 30 (ref 12–28)
CALCIUM SERPL-MCNC: 9.7 MG/DL (ref 8.7–10.3)
CHLORIDE SERPL-SCNC: 102 MMOL/L (ref 96–106)
CO2 SERPL-SCNC: 28 MMOL/L (ref 20–29)
CREAT SERPL-MCNC: 0.76 MG/DL (ref 0.57–1)
EOSINOPHIL # BLD AUTO: 0.1 X10E3/UL (ref 0–0.4)
EOSINOPHIL NFR BLD AUTO: 1 %
ERYTHROCYTE [DISTWIDTH] IN BLOOD BY AUTOMATED COUNT: 13.4 % (ref 11.7–15.4)
GLOBULIN SER CALC-MCNC: 2.3 G/DL (ref 1.5–4.5)
GLUCOSE SERPL-MCNC: 89 MG/DL (ref 65–99)
HCT VFR BLD AUTO: 37 % (ref 34–46.6)
HGB BLD-MCNC: 12.1 G/DL (ref 11.1–15.9)
IMM GRANULOCYTES # BLD AUTO: 0 X10E3/UL (ref 0–0.1)
IMM GRANULOCYTES NFR BLD AUTO: 0 %
LYMPHOCYTES # BLD AUTO: 2.2 X10E3/UL (ref 0.7–3.1)
LYMPHOCYTES NFR BLD AUTO: 29 %
MCH RBC QN AUTO: 29.8 PG (ref 26.6–33)
MCHC RBC AUTO-ENTMCNC: 32.7 G/DL (ref 31.5–35.7)
MCV RBC AUTO: 91 FL (ref 79–97)
MONOCYTES # BLD AUTO: 0.9 X10E3/UL (ref 0.1–0.9)
MONOCYTES NFR BLD AUTO: 13 %
NEUTROPHILS # BLD AUTO: 4.2 X10E3/UL (ref 1.4–7)
NEUTROPHILS NFR BLD AUTO: 57 %
PLATELET # BLD AUTO: 189 X10E3/UL (ref 150–450)
POTASSIUM SERPL-SCNC: 5.3 MMOL/L (ref 3.5–5.2)
PROT SERPL-MCNC: 6.8 G/DL (ref 6–8.5)
RBC # BLD AUTO: 4.06 X10E6/UL (ref 3.77–5.28)
SODIUM SERPL-SCNC: 140 MMOL/L (ref 134–144)
T4 FREE SERPL-MCNC: 1.58 NG/DL (ref 0.82–1.77)
TSH SERPL DL<=0.005 MIU/L-ACNC: 2.57 UIU/ML (ref 0.45–4.5)
WBC # BLD AUTO: 7.5 X10E3/UL (ref 3.4–10.8)

## 2020-08-05 ENCOUNTER — TELEPHONE (OUTPATIENT)
Dept: INTERNAL MEDICINE CLINIC | Age: 85
End: 2020-08-05

## 2020-08-05 NOTE — TELEPHONE ENCOUNTER
Casie Atwoodchelle (Self) [phone] (H)     Pt is requesting a call back regarding some onions that she ate over the week end and she was notified there was a recall,  today she has started having diarrhea and some throwing up. and she wonders if that could be the cause?

## 2020-11-10 ENCOUNTER — TELEPHONE (OUTPATIENT)
Dept: INTERNAL MEDICINE CLINIC | Age: 85
End: 2020-11-10

## 2020-11-10 NOTE — TELEPHONE ENCOUNTER
[phone]    Pt has UTI and would like to discuss bringing in a sample of urine.  There are no appts available

## 2020-11-11 ENCOUNTER — OFFICE VISIT (OUTPATIENT)
Dept: INTERNAL MEDICINE CLINIC | Age: 85
End: 2020-11-11
Payer: MEDICARE

## 2020-11-11 VITALS
OXYGEN SATURATION: 98 % | DIASTOLIC BLOOD PRESSURE: 71 MMHG | TEMPERATURE: 97.3 F | SYSTOLIC BLOOD PRESSURE: 131 MMHG | HEIGHT: 61 IN | HEART RATE: 83 BPM | WEIGHT: 115.2 LBS | BODY MASS INDEX: 21.75 KG/M2 | RESPIRATION RATE: 16 BRPM

## 2020-11-11 DIAGNOSIS — R30.0 DYSURIA: Primary | ICD-10-CM

## 2020-11-11 LAB
BILIRUB UR QL STRIP: NEGATIVE
GLUCOSE UR-MCNC: NEGATIVE MG/DL
KETONES P FAST UR STRIP-MCNC: NEGATIVE MG/DL
PH UR STRIP: 6 [PH] (ref 4.6–8)
PROT UR QL STRIP: NEGATIVE
SP GR UR STRIP: 1.01 (ref 1–1.03)
UA UROBILINOGEN AMB POC: NORMAL (ref 0.2–1)
URINALYSIS CLARITY POC: CLEAR
URINALYSIS COLOR POC: YELLOW
URINE BLOOD POC: NORMAL
URINE LEUKOCYTES POC: NORMAL
URINE NITRITES POC: NEGATIVE

## 2020-11-11 PROCEDURE — G8427 DOCREV CUR MEDS BY ELIG CLIN: HCPCS | Performed by: INTERNAL MEDICINE

## 2020-11-11 PROCEDURE — 1101F PT FALLS ASSESS-DOCD LE1/YR: CPT | Performed by: INTERNAL MEDICINE

## 2020-11-11 PROCEDURE — 99213 OFFICE O/P EST LOW 20 MIN: CPT | Performed by: INTERNAL MEDICINE

## 2020-11-11 PROCEDURE — G8420 CALC BMI NORM PARAMETERS: HCPCS | Performed by: INTERNAL MEDICINE

## 2020-11-11 PROCEDURE — G8536 NO DOC ELDER MAL SCRN: HCPCS | Performed by: INTERNAL MEDICINE

## 2020-11-11 PROCEDURE — 1090F PRES/ABSN URINE INCON ASSESS: CPT | Performed by: INTERNAL MEDICINE

## 2020-11-11 PROCEDURE — G0463 HOSPITAL OUTPT CLINIC VISIT: HCPCS | Performed by: INTERNAL MEDICINE

## 2020-11-11 PROCEDURE — G8432 DEP SCR NOT DOC, RNG: HCPCS | Performed by: INTERNAL MEDICINE

## 2020-11-11 PROCEDURE — 81003 URINALYSIS AUTO W/O SCOPE: CPT | Performed by: INTERNAL MEDICINE

## 2020-11-11 RX ORDER — NITROFURANTOIN 25; 75 MG/1; MG/1
100 CAPSULE ORAL 2 TIMES DAILY
Qty: 10 CAP | Refills: 0 | Status: SHIPPED | OUTPATIENT
Start: 2020-11-11 | End: 2020-11-16

## 2020-11-11 NOTE — PROGRESS NOTES
Subjective:      Jevon Reyes is a 80 y.o. female who presents today with concerns of uti. She had burning with urination yesterday. Increased her water intake and the burning has resolved today. Patient Active Problem List   Diagnosis Code    Colon polyp K63.5    Elevated cholesterol E78.00    Benign essential hypertension I10    Acquired hypothyroidism E03.9    Arthritis of both knees M17.0    TB (tuberculosis), treated A15.9    Vitamin D deficiency E55.9     Current Outpatient Medications   Medication Sig Dispense Refill    nabumetone (RELAFEN) 500 mg tablet Take  by mouth two (2) times daily as needed for Pain.  losartan (COZAAR) 50 mg tablet Take 1 Tab by mouth daily. 90 Tab 1    levothyroxine (SYNTHROID) 75 mcg tablet TAKE 1 TABLET ONCE A DAY ON 6 DAYS OF EACH WEEK. 90 Tab 0    cholecalciferol (VITAMIN D3) 1,000 unit tablet Take 2,000 Units by mouth daily.  vit A/vit C/vit E/zinc/copper (ICAPS AREDS PO) Take  by mouth daily. Review of Systems    Pertinent items are noted in HPI. Objective: Wt Readings from Last 3 Encounters:   11/11/20 115 lb 3.2 oz (52.3 kg)   08/03/20 123 lb (55.8 kg)   08/19/19 123 lb (55.8 kg)     BP Readings from Last 3 Encounters:   11/11/20 131/71   08/03/20 140/60   08/19/19 138/60     Visit Vitals  /71   Pulse 83   Temp 97.3 °F (36.3 °C) (Temporal)   Resp 16   Ht 5' 1\" (1.549 m)   Wt 115 lb 3.2 oz (52.3 kg)   SpO2 98%   BMI 21.77 kg/m²     General appearance: alert, cooperative, no distress, appears stated age  Head: Normocephalic, without obvious abnormality, atraumatic  Abdomen: soft, non-tender.  Bowel sounds normal. No masses,  no organomegaly    Results for orders placed or performed in visit on 11/11/20   AMB POC URINALYSIS DIP STICK AUTO W/O MICRO   Result Value Ref Range    Color (UA POC) Yellow     Clarity (UA POC) Clear     Glucose (UA POC) Negative Negative    Bilirubin (UA POC) Negative Negative    Ketones (UA POC) Negative Negative    Specific gravity (UA POC) 1.010 1.001 - 1.035    Blood (UA POC) Trace Negative    pH (UA POC) 6.0 4.6 - 8.0    Protein (UA POC) Negative Negative    Urobilinogen (UA POC) 0.2 mg/dL 0.2 - 1    Nitrites (UA POC) Negative Negative    Leukocyte esterase (UA POC) 1+ Negative      Assessment/Plan:     1. Dysuria  -ua noted for leukocyte esterase   -urine culture sent  -started on macrobid 100mg bid for 5 days.     - AMB POC URINALYSIS DIP STICK AUTO W/O MICRO  - CULTURE, URINE; Future     Orders Placed This Encounter    CULTURE, URINE     Standing Status:   Future     Standing Expiration Date:   11/11/2021    AMB POC URINALYSIS DIP STICK AUTO W/O MICRO    nitrofurantoin, macrocrystal-monohydrate, (MACROBID) 100 mg capsule     Sig: Take 1 Cap by mouth two (2) times a day for 5 days. Dispense:  10 Cap     Refill:  0      Follow-up Disposition:     Return if symptoms worsen or fail to improve. Advised patient to call back or return to office if symptoms worsen/change/persist.     Discussed expected course/resolution/complications of diagnosis in detail with patient. Medication risks/benefits/costs/interactions/alternatives discussed with patient. Patient was given an after visit summary which includes diagnoses, current medications, & vitals. Patient expressed understanding with the diagnosis and plan.

## 2020-11-12 LAB
BACTERIA SPEC CULT: NORMAL
SERVICE CMNT-IMP: NORMAL

## 2020-12-24 ENCOUNTER — TELEPHONE (OUTPATIENT)
Dept: INTERNAL MEDICINE CLINIC | Age: 85
End: 2020-12-24

## 2020-12-28 ENCOUNTER — OFFICE VISIT (OUTPATIENT)
Dept: INTERNAL MEDICINE CLINIC | Age: 85
End: 2020-12-28
Payer: COMMERCIAL

## 2020-12-28 VITALS
OXYGEN SATURATION: 97 % | HEART RATE: 87 BPM | BODY MASS INDEX: 21.11 KG/M2 | WEIGHT: 111.8 LBS | SYSTOLIC BLOOD PRESSURE: 140 MMHG | TEMPERATURE: 98 F | RESPIRATION RATE: 16 BRPM | DIASTOLIC BLOOD PRESSURE: 72 MMHG | HEIGHT: 61 IN

## 2020-12-28 DIAGNOSIS — N39.0 URINARY TRACT INFECTION WITH HEMATURIA, SITE UNSPECIFIED: Primary | ICD-10-CM

## 2020-12-28 DIAGNOSIS — R31.9 URINARY TRACT INFECTION WITH HEMATURIA, SITE UNSPECIFIED: Primary | ICD-10-CM

## 2020-12-28 LAB
BILIRUB UR QL STRIP: NORMAL
GLUCOSE UR-MCNC: NEGATIVE MG/DL
KETONES P FAST UR STRIP-MCNC: NEGATIVE MG/DL
PH UR STRIP: 5 [PH] (ref 4.6–8)
PROT UR QL STRIP: NORMAL
SP GR UR STRIP: 1.02 (ref 1–1.03)
UA UROBILINOGEN AMB POC: NORMAL (ref 0.2–1)
URINALYSIS CLARITY POC: CLEAR
URINALYSIS COLOR POC: YELLOW
URINE BLOOD POC: NORMAL
URINE LEUKOCYTES POC: NORMAL
URINE NITRITES POC: NEGATIVE

## 2020-12-28 PROCEDURE — G8536 NO DOC ELDER MAL SCRN: HCPCS | Performed by: INTERNAL MEDICINE

## 2020-12-28 PROCEDURE — G8427 DOCREV CUR MEDS BY ELIG CLIN: HCPCS | Performed by: INTERNAL MEDICINE

## 2020-12-28 PROCEDURE — 1101F PT FALLS ASSESS-DOCD LE1/YR: CPT | Performed by: INTERNAL MEDICINE

## 2020-12-28 PROCEDURE — G8432 DEP SCR NOT DOC, RNG: HCPCS | Performed by: INTERNAL MEDICINE

## 2020-12-28 PROCEDURE — G8420 CALC BMI NORM PARAMETERS: HCPCS | Performed by: INTERNAL MEDICINE

## 2020-12-28 PROCEDURE — 99213 OFFICE O/P EST LOW 20 MIN: CPT | Performed by: INTERNAL MEDICINE

## 2020-12-28 PROCEDURE — 81003 URINALYSIS AUTO W/O SCOPE: CPT | Performed by: INTERNAL MEDICINE

## 2020-12-28 PROCEDURE — 1090F PRES/ABSN URINE INCON ASSESS: CPT | Performed by: INTERNAL MEDICINE

## 2020-12-28 PROCEDURE — G0463 HOSPITAL OUTPT CLINIC VISIT: HCPCS | Performed by: INTERNAL MEDICINE

## 2020-12-28 RX ORDER — NITROFURANTOIN 25; 75 MG/1; MG/1
100 CAPSULE ORAL 2 TIMES DAILY
Qty: 10 CAP | Refills: 0 | Status: SHIPPED | OUTPATIENT
Start: 2020-12-28 | End: 2021-01-02

## 2020-12-28 NOTE — PATIENT INSTRUCTIONS
Increase water intake    May use tylenol as needed for discomfort. A glass of cranberry juice daily.

## 2020-12-28 NOTE — PROGRESS NOTES
Subjective:      Sylvia Kaye is a 80 y.o. female who presents today with her daugher. Noted back pain about 1 week ago. Started to have some lower abdominal pain as well. Patient Active Problem List   Diagnosis Code    Colon polyp K63.5    Elevated cholesterol E78.00    Benign essential hypertension I10    Acquired hypothyroidism E03.9    Arthritis of both knees M17.0    TB (tuberculosis), treated A15.9    Vitamin D deficiency E55.9     Current Outpatient Medications   Medication Sig Dispense Refill    ZINC ACETATE PO Take  by mouth.  nitrofurantoin, macrocrystal-monohydrate, (MACROBID) 100 mg capsule Take 1 Cap by mouth two (2) times a day for 5 days. 10 Cap 0    vit A/vit C/vit E/zinc/copper (ICAPS AREDS PO) Take  by mouth daily.  losartan (COZAAR) 50 mg tablet Take 1 Tab by mouth daily. 90 Tab 1    levothyroxine (SYNTHROID) 75 mcg tablet TAKE 1 TABLET ONCE A DAY ON 6 DAYS OF EACH WEEK. 90 Tab 0    cholecalciferol (VITAMIN D3) 1,000 unit tablet Take 2,000 Units by mouth daily.  nabumetone (RELAFEN) 500 mg tablet Take  by mouth two (2) times daily as needed for Pain. Review of Systems    Pertinent items are noted in HPI. Objective: Wt Readings from Last 3 Encounters:   12/28/20 111 lb 12.8 oz (50.7 kg)   11/11/20 115 lb 3.2 oz (52.3 kg)   08/03/20 123 lb (55.8 kg)     BP Readings from Last 3 Encounters:   12/28/20 (!) 140/72   11/11/20 131/71   08/03/20 140/60     Visit Vitals  BP (!) 140/72   Pulse 87   Temp 98 °F (36.7 °C) (Temporal)   Resp 16   Ht 5' 1\" (1.549 m)   Wt 111 lb 12.8 oz (50.7 kg)   SpO2 97%   BMI 21.12 kg/m²     General appearance: alert, cooperative, no distress, appears stated age  Abdomen: soft, non-tender. Bowel sounds normal. No masses,  no organomegaly  Back: no CVA tenderness.      Results for orders placed or performed in visit on 12/28/20   AMB POC URINALYSIS DIP STICK AUTO W/O MICRO   Result Value Ref Range    Color (UA POC) Yellow     Clarity (UA POC) Clear     Glucose (UA POC) Negative Negative    Bilirubin (UA POC) 1+ Negative    Ketones (UA POC) Negative Negative    Specific gravity (UA POC) 1.025 1.001 - 1.035    Blood (UA POC) 1+ Negative    pH (UA POC) 5.0 4.6 - 8.0    Protein (UA POC) 1+ Negative    Urobilinogen (UA POC) 0.2 mg/dL 0.2 - 1    Nitrites (UA POC) Negative Negative    Leukocyte esterase (UA POC) 1+ Negative      Assessment/Plan:     1. Urinary tract infection with hematuria, site unspecified  -given course of macrobid  -will send urine for culture.   -encouraged her to increase her water intake. - CULTURE, URINE; Future  - AMB POC URINALYSIS DIP STICK AUTO W/O MICRO     Follow-up Disposition:       Return if symptoms worsen or fail to improve. Advised patient to call back or return to office if symptoms worsen/change/persist.     Discussed expected course/resolution/complications of diagnosis in detail with patient. Medication risks/benefits/costs/interactions/alternatives discussed with patient. Patient was given an after visit summary which includes diagnoses, current medications, & vitals. Patient expressed understanding with the diagnosis and plan.

## 2020-12-30 ENCOUNTER — TELEPHONE (OUTPATIENT)
Dept: INTERNAL MEDICINE CLINIC | Age: 85
End: 2020-12-30

## 2020-12-30 NOTE — TELEPHONE ENCOUNTER
Hitesh Schaller [phone]    The patient is having side and back pain. The patient is still on medication.  Please call to discuss what else can be done
Patient is better, but daughter is worried about her mother feeling poorly through New years. They will encourage her to increase her water intake. If she declines, becomes weaker, fever spikes, increased confusion, she needs to go to ER. Daughter, Elza Pettit,  states understanding.
Yes

## 2021-01-01 LAB
BACTERIA SPEC CULT: NORMAL
CC UR VC: NORMAL
SERVICE CMNT-IMP: NORMAL

## 2021-01-05 ENCOUNTER — HOSPITAL ENCOUNTER (EMERGENCY)
Age: 86
Discharge: HOME | End: 2021-01-05
Attending: EMERGENCY MEDICINE | Admitting: EMERGENCY MEDICINE
Payer: MEDICARE

## 2021-01-05 ENCOUNTER — APPOINTMENT (OUTPATIENT)
Dept: CT IMAGING | Age: 86
End: 2021-01-05
Attending: STUDENT IN AN ORGANIZED HEALTH CARE EDUCATION/TRAINING PROGRAM
Payer: MEDICARE

## 2021-01-05 ENCOUNTER — TELEPHONE (OUTPATIENT)
Dept: INTERNAL MEDICINE CLINIC | Age: 86
End: 2021-01-05

## 2021-01-05 VITALS
RESPIRATION RATE: 18 BRPM | TEMPERATURE: 98.8 F | DIASTOLIC BLOOD PRESSURE: 98 MMHG | HEART RATE: 100 BPM | OXYGEN SATURATION: 96 % | SYSTOLIC BLOOD PRESSURE: 181 MMHG

## 2021-01-05 DIAGNOSIS — R10.31 ABDOMINAL PAIN, RIGHT LOWER QUADRANT: ICD-10-CM

## 2021-01-05 DIAGNOSIS — M51.36 DEGENERATIVE DISC DISEASE, LUMBAR: ICD-10-CM

## 2021-01-05 DIAGNOSIS — M54.50 ACUTE RIGHT-SIDED LOW BACK PAIN WITHOUT SCIATICA: Primary | ICD-10-CM

## 2021-01-05 LAB
ALBUMIN SERPL-MCNC: 3.9 G/DL (ref 3.5–5)
ALBUMIN/GLOB SERPL: 1 {RATIO} (ref 1.1–2.2)
ALP SERPL-CCNC: 83 U/L (ref 45–117)
ALT SERPL-CCNC: 24 U/L (ref 12–78)
ANION GAP SERPL CALC-SCNC: 7 MMOL/L (ref 5–15)
APPEARANCE UR: CLEAR
AST SERPL-CCNC: 17 U/L (ref 15–37)
BACTERIA URNS QL MICRO: NEGATIVE /HPF
BASOPHILS # BLD: 0 K/UL (ref 0–0.1)
BASOPHILS NFR BLD: 0 % (ref 0–1)
BILIRUB SERPL-MCNC: 0.6 MG/DL (ref 0.2–1)
BILIRUB UR QL: NEGATIVE
BUN SERPL-MCNC: 15 MG/DL (ref 6–20)
BUN/CREAT SERPL: 18 (ref 12–20)
CALCIUM SERPL-MCNC: 10.3 MG/DL (ref 8.5–10.1)
CHLORIDE SERPL-SCNC: 100 MMOL/L (ref 97–108)
CO2 SERPL-SCNC: 30 MMOL/L (ref 21–32)
COLOR UR: ABNORMAL
COMMENT, HOLDF: NORMAL
CREAT SERPL-MCNC: 0.85 MG/DL (ref 0.55–1.02)
DIFFERENTIAL METHOD BLD: ABNORMAL
EOSINOPHIL # BLD: 0.4 K/UL (ref 0–0.4)
EOSINOPHIL NFR BLD: 6 % (ref 0–7)
EPITH CASTS URNS QL MICRO: ABNORMAL /LPF
ERYTHROCYTE [DISTWIDTH] IN BLOOD BY AUTOMATED COUNT: 14 % (ref 11.5–14.5)
GLOBULIN SER CALC-MCNC: 4.1 G/DL (ref 2–4)
GLUCOSE SERPL-MCNC: 98 MG/DL (ref 65–100)
GLUCOSE UR STRIP.AUTO-MCNC: NEGATIVE MG/DL
HCT VFR BLD AUTO: 36.7 % (ref 35–47)
HGB BLD-MCNC: 11.9 G/DL (ref 11.5–16)
HGB UR QL STRIP: ABNORMAL
HYALINE CASTS URNS QL MICRO: ABNORMAL /LPF (ref 0–5)
IMM GRANULOCYTES # BLD AUTO: 0.1 K/UL (ref 0–0.04)
IMM GRANULOCYTES NFR BLD AUTO: 1 % (ref 0–0.5)
KETONES UR QL STRIP.AUTO: NEGATIVE MG/DL
LACTATE SERPL-SCNC: 1 MMOL/L (ref 0.4–2)
LEUKOCYTE ESTERASE UR QL STRIP.AUTO: NEGATIVE
LIPASE SERPL-CCNC: 180 U/L (ref 73–393)
LYMPHOCYTES # BLD: 2.4 K/UL (ref 0.8–3.5)
LYMPHOCYTES NFR BLD: 32 % (ref 12–49)
MCH RBC QN AUTO: 29.2 PG (ref 26–34)
MCHC RBC AUTO-ENTMCNC: 32.4 G/DL (ref 30–36.5)
MCV RBC AUTO: 90.2 FL (ref 80–99)
MONOCYTES # BLD: 1 K/UL (ref 0–1)
MONOCYTES NFR BLD: 13 % (ref 5–13)
NEUTS SEG # BLD: 3.6 K/UL (ref 1.8–8)
NEUTS SEG NFR BLD: 48 % (ref 32–75)
NITRITE UR QL STRIP.AUTO: NEGATIVE
NRBC # BLD: 0 K/UL (ref 0–0.01)
NRBC BLD-RTO: 0 PER 100 WBC
PH UR STRIP: 6 [PH] (ref 5–8)
PLATELET # BLD AUTO: 266 K/UL (ref 150–400)
PMV BLD AUTO: 9.4 FL (ref 8.9–12.9)
POTASSIUM SERPL-SCNC: 4 MMOL/L (ref 3.5–5.1)
PROT SERPL-MCNC: 8 G/DL (ref 6.4–8.2)
PROT UR STRIP-MCNC: NEGATIVE MG/DL
RBC # BLD AUTO: 4.07 M/UL (ref 3.8–5.2)
RBC #/AREA URNS HPF: ABNORMAL /HPF (ref 0–5)
SAMPLES BEING HELD,HOLD: NORMAL
SODIUM SERPL-SCNC: 137 MMOL/L (ref 136–145)
SP GR UR REFRACTOMETRY: 1.02 (ref 1–1.03)
TROPONIN I SERPL-MCNC: <0.05 NG/ML
UR CULT HOLD, URHOLD: NORMAL
UROBILINOGEN UR QL STRIP.AUTO: 0.2 EU/DL (ref 0.2–1)
WBC # BLD AUTO: 7.5 K/UL (ref 3.6–11)
WBC URNS QL MICRO: ABNORMAL /HPF (ref 0–4)

## 2021-01-05 PROCEDURE — 74177 CT ABD & PELVIS W/CONTRAST: CPT

## 2021-01-05 PROCEDURE — 74011250637 HC RX REV CODE- 250/637: Performed by: STUDENT IN AN ORGANIZED HEALTH CARE EDUCATION/TRAINING PROGRAM

## 2021-01-05 PROCEDURE — 83605 ASSAY OF LACTIC ACID: CPT

## 2021-01-05 PROCEDURE — 83690 ASSAY OF LIPASE: CPT

## 2021-01-05 PROCEDURE — 36415 COLL VENOUS BLD VENIPUNCTURE: CPT

## 2021-01-05 PROCEDURE — 93005 ELECTROCARDIOGRAM TRACING: CPT

## 2021-01-05 PROCEDURE — 84484 ASSAY OF TROPONIN QUANT: CPT

## 2021-01-05 PROCEDURE — 81001 URINALYSIS AUTO W/SCOPE: CPT

## 2021-01-05 PROCEDURE — 99282 EMERGENCY DEPT VISIT SF MDM: CPT

## 2021-01-05 PROCEDURE — 74011000636 HC RX REV CODE- 636: Performed by: RADIOLOGY

## 2021-01-05 PROCEDURE — 85025 COMPLETE CBC W/AUTO DIFF WBC: CPT

## 2021-01-05 PROCEDURE — 96374 THER/PROPH/DIAG INJ IV PUSH: CPT

## 2021-01-05 PROCEDURE — 74011250636 HC RX REV CODE- 250/636: Performed by: STUDENT IN AN ORGANIZED HEALTH CARE EDUCATION/TRAINING PROGRAM

## 2021-01-05 PROCEDURE — 80053 COMPREHEN METABOLIC PANEL: CPT

## 2021-01-05 RX ORDER — LIDOCAINE 50 MG/G
PATCH TOPICAL
Qty: 15 EACH | Refills: 0 | Status: SHIPPED | OUTPATIENT
Start: 2021-01-05 | End: 2021-08-19 | Stop reason: ALTCHOICE

## 2021-01-05 RX ORDER — OXYCODONE AND ACETAMINOPHEN 5; 325 MG/1; MG/1
1 TABLET ORAL ONCE
Status: COMPLETED | OUTPATIENT
Start: 2021-01-05 | End: 2021-01-05

## 2021-01-05 RX ORDER — ONDANSETRON 2 MG/ML
4 INJECTION INTRAMUSCULAR; INTRAVENOUS
Status: COMPLETED | OUTPATIENT
Start: 2021-01-05 | End: 2021-01-05

## 2021-01-05 RX ORDER — TRAMADOL HYDROCHLORIDE 50 MG/1
50 TABLET ORAL
Qty: 9 TAB | Refills: 0 | Status: SHIPPED | OUTPATIENT
Start: 2021-01-05 | End: 2021-01-08

## 2021-01-05 RX ADMIN — OXYCODONE HYDROCHLORIDE AND ACETAMINOPHEN 1 TABLET: 5; 325 TABLET ORAL at 17:46

## 2021-01-05 RX ADMIN — IOPAMIDOL 100 ML: 755 INJECTION, SOLUTION INTRAVENOUS at 17:28

## 2021-01-05 RX ADMIN — SODIUM CHLORIDE 1000 ML: 9 INJECTION, SOLUTION INTRAVENOUS at 17:50

## 2021-01-05 RX ADMIN — ONDANSETRON 4 MG: 2 INJECTION INTRAMUSCULAR; INTRAVENOUS at 17:46

## 2021-01-05 NOTE — ED TRIAGE NOTES
She arrives ambulatory saying she has had right sided back pain and right lower quadrant abdominal pain for over a weeks. She has had three days of evening explosive diarrhea. She saw Dr. Ramirez Bell 12/28 and was treated for a UTI with Macrobid. She finished this medication but the pain has continued and she is treating it with Tylenol that only helps slightly.

## 2021-01-05 NOTE — ED PROVIDER NOTES
Patient is a 80year old female who presents to ED c/o right sided flank pain first appreciated 10 days prior. Patient reports pain radiates to abdomen and also c/o abdominal distension and decreased appetite. Patient was seen by Dr. Figueroa Rizzo on 12/28 and started on antibiotics for UTI. Patient reports she finished those antibiotics and then a few days ago for 3 nights straight she had diarrhea. Patient reports last episode of diarrhea during the night was 2 nights prior. Patient denies any nausea, vomiting, chest pain, shortness of breath, fever, chills, dysuria, decreased urination, hematuria or bloody stool. Per daughter, patient was sent by Dr. Figueroa Rizzo for CT scan to further evaluate symptoms.             Past Medical History:   Diagnosis Date    Hypercholesterolemia     Hypertension     Thyroid disease     hypothyroid       Past Surgical History:   Procedure Laterality Date    ENDOSCOPY, COLON, DIAGNOSTIC  2001    HX BACK SURGERY  1984    HX BREAST BIOPSY  2010    duct removal, benign    HX DILATION AND CURETTAGE      HX HYSTERECTOMY  1980 +BSO    HX OPEN REDUCTION INTERNAL FIXATION  1996    L forearm    VASCULAR SURGERY PROCEDURE UNLIST  1960s    vein stripping-bilat         Family History:   Problem Relation Age of Onset    Cancer Father         liver    Colon Cancer Mother     Cancer Mother         colon    Cancer Sister         liver    Cancer Brother         liver    Colon Polyps Sister     Thyroid Cancer Daughter        Social History     Socioeconomic History    Marital status:      Spouse name: Not on file    Number of children: Not on file    Years of education: Not on file    Highest education level: Not on file   Occupational History    Not on file   Social Needs    Financial resource strain: Not on file    Food insecurity     Worry: Not on file     Inability: Not on file    Transportation needs     Medical: Not on file     Non-medical: Not on file   Tobacco Use    Smoking status: Never Smoker    Smokeless tobacco: Never Used   Substance and Sexual Activity    Alcohol use: No    Drug use: No    Sexual activity: Not Currently   Lifestyle    Physical activity     Days per week: Not on file     Minutes per session: Not on file    Stress: Not on file   Relationships    Social connections     Talks on phone: Not on file     Gets together: Not on file     Attends [employer] service: Not on file     Active member of club or organization: Not on file     Attends meetings of clubs or organizations: Not on file     Relationship status: Not on file    Intimate partner violence     Fear of current or ex partner: Not on file     Emotionally abused: Not on file     Physically abused: Not on file     Forced sexual activity: Not on file   Other Topics Concern    Not on file   Social History Narrative    Ms. Pa Zapata lives at [facility] in the independent living. Cares for her own apartment and manages her medications. ALLERGIES: Morphine and Cortisone    Review of Systems   Constitutional: Positive for activity change. Negative for chills and fever. HENT: Negative for congestion, ear pain, facial swelling, nosebleeds, rhinorrhea, sinus pressure, sinus pain and sore throat. Eyes: Negative for pain and discharge. Respiratory: Negative for cough and shortness of breath. Cardiovascular: Negative for chest pain and palpitations. Gastrointestinal: Positive for abdominal pain. Negative for diarrhea, nausea and vomiting. Genitourinary: Positive for flank pain. Negative for decreased urine volume, difficulty urinating, dyspareunia, dysuria, frequency and urgency. Musculoskeletal: Negative for back pain and neck pain. Skin: Negative for rash. Allergic/Immunologic: Negative for immunocompromised state. Neurological: Negative for syncope, weakness and headaches. Psychiatric/Behavioral: Negative for confusion. All other systems reviewed and are negative.       Vitals: 01/05/21 1625   BP: (!) 181/98   Pulse: 100   Resp: 18   Temp: 98.8 °F (37.1 °C)   SpO2: 96%            Physical Exam  Vitals signs and nursing note reviewed. Constitutional:       Appearance: Normal appearance. She is well-developed. Comments: Well appearing female, not ill appearing    HENT:      Head: Normocephalic and atraumatic. Nose: Nose normal.      Mouth/Throat:      Mouth: Mucous membranes are moist.   Eyes:      General: Lids are normal.      Extraocular Movements: Extraocular movements intact. Conjunctiva/sclera: Conjunctivae normal.   Neck:      Musculoskeletal: Normal range of motion and neck supple. Cardiovascular:      Rate and Rhythm: Normal rate and regular rhythm. Pulses: Normal pulses. Heart sounds: Normal heart sounds, S1 normal and S2 normal. No murmur. No friction rub. No gallop. Pulmonary:      Effort: Pulmonary effort is normal. No accessory muscle usage. Breath sounds: Normal breath sounds. No stridor. No wheezing, rhonchi or rales. Abdominal:      General: Abdomen is flat. Bowel sounds are normal.      Palpations: Abdomen is soft. Tenderness: There is no abdominal tenderness. Musculoskeletal: Normal range of motion. Skin:     General: Skin is warm and dry. Capillary Refill: Capillary refill takes less than 2 seconds. Neurological:      General: No focal deficit present. Mental Status: She is alert and oriented to person, place, and time. Mental status is at baseline. Psychiatric:         Attention and Perception: Attention normal.         Mood and Affect: Mood and affect normal.         Speech: Speech normal.         Behavior: Behavior normal. Behavior is cooperative. Thought Content:  Thought content normal.         Cognition and Memory: Cognition normal.         Judgment: Judgment normal.          MDM  Number of Diagnoses or Management Options  Abdominal pain, right lower quadrant  Acute right-sided low back pain without sciatica  Degenerative disc disease, lumbar  Diagnosis management comments: Workup overall unremarkable. CT abdomen/pelvis showed No destructive bone lesion. Multilevel degenerative change with bulging disc material greatest at L3-4 and L4-5. Otherwise unremarkable. Pain likely due to degenerative disc disease given patient reports it is improved with heating pad, and is resolved after 1 dose of Percocet. Will plan to discharge patient home with daughter with outpatient f/u with Dr. Freya Trujillo as well as referral to ortho spine. Patient and daughter were given the option for COVID-19 testing, but they declined and I agreed with their decision because currently patient does not present with symptoms resembling covid-19. Strict return to ER warnings provided. Amount and/or Complexity of Data Reviewed  Clinical lab tests: ordered and reviewed  Tests in the radiology section of CPT®: ordered and reviewed  Discuss the patient with other providers: yes (Dr. Deja Ho, ED Attending )      ED Course as of Jan 05 2103   Tue Jan 05, 2021 1916 CBC WITH AUTOMATED DIFF(!):    WBC 7.5   RBC 4.07   HGB 11.9   HCT 36.7   MCV 90.2   MCH 29.2   MCHC 32.4   RDW 14.0   PLATELET 062   MPV 9.4   NRBC 0.0   ABSOLUTE NRBC 0.00   NEUTROPHILS 48   LYMPHOCYTES 32   MONOCYTES 13   EOSINOPHILS 6   BASOPHILS 0   IMMATURE GRANULOCYTES 1(!)   ABS. NEUTROPHILS 3.6   ABS. LYMPHOCYTES 2.4   ABS. MONOCYTES 1.0   ABS. EOSINOPHILS 0.4   ABS. BASOPHILS 0.0   ABS. IMM. GRANS. 0.1(!)   DF AUTOMATED [KM]   2154 METABOLIC PANEL, COMPREHENSIVE(!):    Sodium 137   Potassium 4.0   Chloride 100   CO2 30   Anion gap 7   Glucose 98   BUN 15   Creatinine 0.85   BUN/Creatinine ratio 18   GFR est AA >60   GFR est non-AA >60   Calcium 10. 3(!)   Bilirubin, total 0.6   ALT 24   AST 17   Alk.  phosphatase 83   Protein, total 8.0   Albumin 3.9   Globulin 4.1(!)   A-G Ratio 1.0(!) [KM]   1917 TROPONIN I:    Troponin-I, Qt. <0.05 [KM]   1917 LACTIC ACID:    Lactic acid 1.0 [KM]   1917 LIPASE:    Lipase 180 [KM]   1917 CT ABD PELV W CONT [KM]      ED Course User Index  [KM] GABBY Youssef       Procedures

## 2021-01-05 NOTE — TELEPHONE ENCOUNTER
Patient seen on 12/28/2020 for back pain and some lower abdominal pain. No other symptoms. ua at that visit showed blood and leukocytes. Started antibiotics for likely uti. She completed course of macrobid. She is not feeling any better. Actually worse. More lethargic. Now ahs bloating in her lower abdomen. Decreased appetite. She has been trying to increase her fluids. She has diarrhea now. A few episodes per day. She is sounding very lethargic on the phone. I recommended she go to the ER for evaluation. Concerned for dehydration, possible COVID - no known contacts, and worsening abdominal bloating. Patient states understanding and agrees with plan.

## 2021-01-05 NOTE — TELEPHONE ENCOUNTER
Lindsay Doyle (Self) [phone] (H)     Pt says that she was seen on 12/28/20 for a possible uti and she has not received the lab results yet. She says that her back is still very painful and her stomach seems bloated. She has had some diarrhea.

## 2021-01-06 LAB
ATRIAL RATE: 82 BPM
CALCULATED P AXIS, ECG09: -7 DEGREES
CALCULATED R AXIS, ECG10: 18 DEGREES
CALCULATED T AXIS, ECG11: 24 DEGREES
DIAGNOSIS, 93000: NORMAL
P-R INTERVAL, ECG05: 178 MS
Q-T INTERVAL, ECG07: 382 MS
QRS DURATION, ECG06: 74 MS
QTC CALCULATION (BEZET), ECG08: 446 MS
VENTRICULAR RATE, ECG03: 82 BPM

## 2021-01-06 NOTE — ED NOTES
Pt given written and verbal discharge instructions; pt verbalized understanding of such. VSS at time of discharge. Belongings in pt possession at time of discharge. Pt ambulatory out of ED without difficulty in NAD. No complaints, needs, or questions at this time. Pt to call PCP ASAP for follow-up.

## 2021-03-18 ENCOUNTER — OFFICE VISIT (OUTPATIENT)
Dept: INTERNAL MEDICINE CLINIC | Age: 86
End: 2021-03-18
Payer: COMMERCIAL

## 2021-03-18 VITALS
RESPIRATION RATE: 18 BRPM | HEIGHT: 61 IN | OXYGEN SATURATION: 99 % | DIASTOLIC BLOOD PRESSURE: 76 MMHG | HEART RATE: 85 BPM | BODY MASS INDEX: 21.34 KG/M2 | SYSTOLIC BLOOD PRESSURE: 159 MMHG | TEMPERATURE: 97.5 F | WEIGHT: 113 LBS

## 2021-03-18 DIAGNOSIS — F43.9 STRESS: ICD-10-CM

## 2021-03-18 DIAGNOSIS — I10 BENIGN ESSENTIAL HYPERTENSION: Primary | ICD-10-CM

## 2021-03-18 DIAGNOSIS — R09.82 POST-NASAL DRAINAGE: ICD-10-CM

## 2021-03-18 DIAGNOSIS — F41.9 ANXIETY: ICD-10-CM

## 2021-03-18 DIAGNOSIS — R42 DIZZINESS: ICD-10-CM

## 2021-03-18 PROCEDURE — G8420 CALC BMI NORM PARAMETERS: HCPCS | Performed by: NURSE PRACTITIONER

## 2021-03-18 PROCEDURE — G8432 DEP SCR NOT DOC, RNG: HCPCS | Performed by: NURSE PRACTITIONER

## 2021-03-18 PROCEDURE — G0463 HOSPITAL OUTPT CLINIC VISIT: HCPCS | Performed by: NURSE PRACTITIONER

## 2021-03-18 PROCEDURE — 1090F PRES/ABSN URINE INCON ASSESS: CPT | Performed by: NURSE PRACTITIONER

## 2021-03-18 PROCEDURE — 1101F PT FALLS ASSESS-DOCD LE1/YR: CPT | Performed by: NURSE PRACTITIONER

## 2021-03-18 PROCEDURE — G8536 NO DOC ELDER MAL SCRN: HCPCS | Performed by: NURSE PRACTITIONER

## 2021-03-18 PROCEDURE — G8427 DOCREV CUR MEDS BY ELIG CLIN: HCPCS | Performed by: NURSE PRACTITIONER

## 2021-03-18 PROCEDURE — 99213 OFFICE O/P EST LOW 20 MIN: CPT | Performed by: NURSE PRACTITIONER

## 2021-03-18 RX ORDER — METOPROLOL SUCCINATE 25 MG/1
25 TABLET, EXTENDED RELEASE ORAL DAILY
Qty: 30 TAB | Refills: 1 | Status: SHIPPED | OUTPATIENT
Start: 2021-03-18 | End: 2021-04-19 | Stop reason: SDUPTHER
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
## 2021-03-18 NOTE — PATIENT INSTRUCTIONS
Learning About Stress  What is stress?    Stress is what you feel when you have to handle more than you are used to. Stress is a fact of life for most people, and it affects everyone differently. What causes stress for you may not be stressful for someone else.  A lot of things can cause stress. You may feel stress when you go on a job interview, take a test, or run a race. This kind of short-term stress is normal and even useful. It can help you if you need to work hard or react quickly. For example, stress can help you finish an important job on time.  Stress also can last a long time. Long-term stress is caused by stressful situations or events. Examples of long-term stress include long-term health problems, ongoing problems at work, or conflicts in your family. Long-term stress can harm your health.  How does stress affect your health?  When you are stressed, your body responds as though you are in danger. It makes hormones that speed up your heart, make you breathe faster, and give you a burst of energy. This is called the fight-or-flight stress response. If the stress is over quickly, your body goes back to normal and no harm is done.  But if stress happens too often or lasts too long, it can have bad effects. Long-term stress can make you more likely to get sick, and it can make symptoms of some diseases worse. If you tense up when you are stressed, you may develop neck, shoulder, or low back pain. Stress is linked to high blood pressure and heart disease.  Stress also harms your emotional health. It can make you koenig, tense, or depressed. Your relationships may suffer, and you may not do well at work or school.  What can you do to manage stress?  How to relax your mind   · Write. It may help to write about things that are bothering you. This helps you find out how much stress you feel and what is causing it. When you know this, you can find better ways to cope.  · Let your feelings out. Talk, laugh,  cry, and express anger when you need to. Talking with friends, family, a counselor, or a member of the clergy about your feelings is a healthy way to relieve stress. · Do something you enjoy. For example, listen to music or go to a movie. Practice your hobby or do volunteer work. · Meditate. This can help you relax, because you are not worrying about what happened before or what may happen in the future. · Do guided imagery. Imagine yourself in any setting that helps you feel calm. You can use audiotapes, books, or a teacher to guide you. How to relax your body   · Do something active. Exercise or activity can help reduce stress. Walking is a great way to get started. Even everyday activities such as housecleaning or yard work can help. · Do breathing exercises. For example:  ? From a standing position, bend forward from the waist with your knees slightly bent. Let your arms dangle close to the floor. ? Breathe in slowly and deeply as you return to a standing position. Roll up slowly and lift your head last.  ? Hold your breath for just a few seconds in the standing position. ? Breathe out slowly and bend forward from the waist.  · Try yoga or joseluis chi. These techniques combine exercise and meditation. You may need some training at first to learn them. What can you do to prevent stress? · Manage your time. This helps you find time to do the things you want and need to do. · Get enough sleep. Your body recovers from the stresses of the day while you are sleeping. · Get support. Your family, friends, and community can make a difference in how you experience stress. Where can you learn more? Go to http://www.gray.com/  Enter [id number] in the search box to learn more about \"Learning About Stress. \"  Current as of: December 16, 2019               Content Version: 12.6  © 8197-2037 [employer].    Care instructions adapted under license by [employer] (which disclaims liability or warranty for this information). If you have questions about a medical condition or this instruction, always ask your healthcare professional. [place] [address] any warranty or liability for your use of this information.

## 2021-04-19 ENCOUNTER — OFFICE VISIT (OUTPATIENT)
Dept: INTERNAL MEDICINE CLINIC | Age: 86
End: 2021-04-19
Payer: MEDICARE

## 2021-04-19 VITALS
TEMPERATURE: 97.5 F | HEIGHT: 61 IN | DIASTOLIC BLOOD PRESSURE: 70 MMHG | HEART RATE: 80 BPM | OXYGEN SATURATION: 96 % | SYSTOLIC BLOOD PRESSURE: 140 MMHG | RESPIRATION RATE: 16 BRPM | BODY MASS INDEX: 21.41 KG/M2 | WEIGHT: 113.4 LBS

## 2021-04-19 DIAGNOSIS — Z79.899 ENCOUNTER FOR LONG-TERM (CURRENT) USE OF MEDICATIONS: ICD-10-CM

## 2021-04-19 DIAGNOSIS — I10 BENIGN ESSENTIAL HYPERTENSION: Primary | ICD-10-CM

## 2021-04-19 PROCEDURE — G8510 SCR DEP NEG, NO PLAN REQD: HCPCS | Performed by: INTERNAL MEDICINE

## 2021-04-19 PROCEDURE — 1101F PT FALLS ASSESS-DOCD LE1/YR: CPT | Performed by: INTERNAL MEDICINE

## 2021-04-19 PROCEDURE — G8427 DOCREV CUR MEDS BY ELIG CLIN: HCPCS | Performed by: INTERNAL MEDICINE

## 2021-04-19 PROCEDURE — 1090F PRES/ABSN URINE INCON ASSESS: CPT | Performed by: INTERNAL MEDICINE

## 2021-04-19 PROCEDURE — 99213 OFFICE O/P EST LOW 20 MIN: CPT | Performed by: INTERNAL MEDICINE

## 2021-04-19 PROCEDURE — G8420 CALC BMI NORM PARAMETERS: HCPCS | Performed by: INTERNAL MEDICINE

## 2021-04-19 PROCEDURE — G8536 NO DOC ELDER MAL SCRN: HCPCS | Performed by: INTERNAL MEDICINE

## 2021-04-19 RX ORDER — METOPROLOL SUCCINATE 25 MG/1
25 TABLET, EXTENDED RELEASE ORAL DAILY
Qty: 90 TAB | Refills: 1 | Status: SHIPPED | OUTPATIENT
Start: 2021-04-19 | End: 2022-01-24

## 2021-04-19 NOTE — PROGRESS NOTES
Subjective:      Dalia Mejia is a 80 y.o. female who presents today for follow up of her hypertension. She was seen on 3/18/21 with Bisi No NP for elevated blood pressure. She was started on toprol xl 25mg daily in addition to her losartan 50mg daily. Current Outpatient Medications   Medication Sig Dispense Refill    metoprolol succinate (TOPROL-XL) 25 mg XL tablet Take 1 Tab by mouth daily. 90 Tab 1    levothyroxine (SYNTHROID) 75 mcg tablet TAKE 1 TABLET ONCE A DAY ON 6 DAYS OF EACH WEEK. 90 Tab 0    losartan (COZAAR) 50 mg tablet TAKE ONE TABLET DAILY. 90 Tab 0    lidocaine (LIDODERM) 5 % Apply patch to the affected area for 12 hours a day and remove for 12 hours a day. 15 Each 0    nabumetone (RELAFEN) 500 mg tablet Take  by mouth two (2) times daily as needed for Pain.  cholecalciferol (VITAMIN D3) 1,000 unit tablet Take 2,000 Units by mouth daily.  ZINC ACETATE PO Take  by mouth.  vit A/vit C/vit E/zinc/copper (ICAPS AREDS PO) Take  by mouth daily. Review of Systems    Pertinent items are noted in HPI. Objective: Wt Readings from Last 3 Encounters:   04/19/21 113 lb 6.4 oz (51.4 kg)   03/18/21 113 lb (51.3 kg)   12/28/20 111 lb 12.8 oz (50.7 kg)     BP Readings from Last 3 Encounters:   04/19/21 (!) 146/59   03/18/21 (!) 159/76   01/05/21 (!) 181/98     Visit Vitals  BP (!) 140/70   Pulse 80   Temp 97.5 °F (36.4 °C) (Temporal)   Resp 16   Ht 5' 1\" (1.549 m)   Wt 113 lb 6.4 oz (51.4 kg)   SpO2 96%   BMI 21.43 kg/m²     General appearance: alert, cooperative, no distress, appears stated age  Head: Normocephalic, without obvious abnormality, atraumatic  Lungs: clear to auscultation bilaterally  Heart: regular rate and rhythm, S1, S2 normal, no murmur, click, rub or gallop    Assessment/Plan:     1. Benign essential hypertension  -in better control. No further adjustments at this time. - metoprolol succinate (TOPROL-XL) 25 mg XL tablet;  Take 1 Tab by mouth daily. Dispense: 90 Tab; Refill: 1    2. Encounter for long-term (current) use of medications      Orders Placed This Encounter    metoprolol succinate (TOPROL-XL) 25 mg XL tablet     Sig: Take 1 Tab by mouth daily. Dispense:  90 Tab     Refill:  1      Follow-up Disposition:     follow up 4 months. Return if symptoms worsen or fail to improve. Advised patient to call back or return to office if symptoms worsen/change/persist.     Discussed expected course/resolution/complications of diagnosis in detail with patient. Medication risks/benefits/costs/interactions/alternatives discussed with patient. Patient was given an after visit summary which includes diagnoses, current medications, & vitals. Patient expressed understanding with the diagnosis and plan.

## 2021-08-19 ENCOUNTER — OFFICE VISIT (OUTPATIENT)
Dept: INTERNAL MEDICINE CLINIC | Age: 86
End: 2021-08-19
Payer: MEDICARE

## 2021-08-19 VITALS
SYSTOLIC BLOOD PRESSURE: 125 MMHG | DIASTOLIC BLOOD PRESSURE: 72 MMHG | OXYGEN SATURATION: 98 % | BODY MASS INDEX: 21.71 KG/M2 | RESPIRATION RATE: 16 BRPM | WEIGHT: 115 LBS | HEART RATE: 77 BPM | HEIGHT: 61 IN | TEMPERATURE: 97.8 F

## 2021-08-19 DIAGNOSIS — Z79.899 ENCOUNTER FOR LONG-TERM (CURRENT) USE OF MEDICATIONS: ICD-10-CM

## 2021-08-19 DIAGNOSIS — Z00.00 MEDICARE ANNUAL WELLNESS VISIT, SUBSEQUENT: Primary | ICD-10-CM

## 2021-08-19 DIAGNOSIS — E03.9 HYPOTHYROIDISM, ADULT: ICD-10-CM

## 2021-08-19 DIAGNOSIS — Z13.31 SCREENING FOR DEPRESSION: ICD-10-CM

## 2021-08-19 DIAGNOSIS — I10 BENIGN ESSENTIAL HYPERTENSION: ICD-10-CM

## 2021-08-19 LAB
ALBUMIN SERPL-MCNC: 3.9 G/DL (ref 3.5–5)
ALBUMIN/GLOB SERPL: 1.3 {RATIO} (ref 1.1–2.2)
ALP SERPL-CCNC: 72 U/L (ref 45–117)
ALT SERPL-CCNC: 19 U/L (ref 12–78)
ANION GAP SERPL CALC-SCNC: 3 MMOL/L (ref 5–15)
AST SERPL-CCNC: 14 U/L (ref 15–37)
BASOPHILS # BLD: 0 K/UL (ref 0–0.1)
BASOPHILS NFR BLD: 0 % (ref 0–1)
BILIRUB SERPL-MCNC: 0.8 MG/DL (ref 0.2–1)
BUN SERPL-MCNC: 24 MG/DL (ref 6–20)
BUN/CREAT SERPL: 34 (ref 12–20)
CALCIUM SERPL-MCNC: 9.3 MG/DL (ref 8.5–10.1)
CHLORIDE SERPL-SCNC: 108 MMOL/L (ref 97–108)
CO2 SERPL-SCNC: 30 MMOL/L (ref 21–32)
CREAT SERPL-MCNC: 0.7 MG/DL (ref 0.55–1.02)
DIFFERENTIAL METHOD BLD: ABNORMAL
EOSINOPHIL # BLD: 0.4 K/UL (ref 0–0.4)
EOSINOPHIL NFR BLD: 6 % (ref 0–7)
ERYTHROCYTE [DISTWIDTH] IN BLOOD BY AUTOMATED COUNT: 14.6 % (ref 11.5–14.5)
GLOBULIN SER CALC-MCNC: 2.9 G/DL (ref 2–4)
GLUCOSE SERPL-MCNC: 75 MG/DL (ref 65–100)
HCT VFR BLD AUTO: 35.1 % (ref 35–47)
HGB BLD-MCNC: 11.3 G/DL (ref 11.5–16)
IMM GRANULOCYTES # BLD AUTO: 0 K/UL (ref 0–0.04)
IMM GRANULOCYTES NFR BLD AUTO: 0 % (ref 0–0.5)
LYMPHOCYTES # BLD: 2.1 K/UL (ref 0.8–3.5)
LYMPHOCYTES NFR BLD: 33 % (ref 12–49)
MCH RBC QN AUTO: 30 PG (ref 26–34)
MCHC RBC AUTO-ENTMCNC: 32.2 G/DL (ref 30–36.5)
MCV RBC AUTO: 93.1 FL (ref 80–99)
MONOCYTES # BLD: 1.1 K/UL (ref 0–1)
MONOCYTES NFR BLD: 16 % (ref 5–13)
NEUTS SEG # BLD: 2.9 K/UL (ref 1.8–8)
NEUTS SEG NFR BLD: 45 % (ref 32–75)
NRBC # BLD: 0 K/UL (ref 0–0.01)
NRBC BLD-RTO: 0 PER 100 WBC
PLATELET # BLD AUTO: 197 K/UL (ref 150–400)
PMV BLD AUTO: 10.3 FL (ref 8.9–12.9)
POTASSIUM SERPL-SCNC: 4.2 MMOL/L (ref 3.5–5.1)
PROT SERPL-MCNC: 6.8 G/DL (ref 6.4–8.2)
RBC # BLD AUTO: 3.77 M/UL (ref 3.8–5.2)
SODIUM SERPL-SCNC: 141 MMOL/L (ref 136–145)
T4 FREE SERPL-MCNC: 1.2 NG/DL (ref 0.8–1.5)
TSH SERPL DL<=0.05 MIU/L-ACNC: 2.65 UIU/ML (ref 0.36–3.74)
WBC # BLD AUTO: 6.5 K/UL (ref 3.6–11)

## 2021-08-19 PROCEDURE — G8420 CALC BMI NORM PARAMETERS: HCPCS | Performed by: INTERNAL MEDICINE

## 2021-08-19 PROCEDURE — 1101F PT FALLS ASSESS-DOCD LE1/YR: CPT | Performed by: INTERNAL MEDICINE

## 2021-08-19 PROCEDURE — G0439 PPPS, SUBSEQ VISIT: HCPCS | Performed by: INTERNAL MEDICINE

## 2021-08-19 PROCEDURE — G8536 NO DOC ELDER MAL SCRN: HCPCS | Performed by: INTERNAL MEDICINE

## 2021-08-19 PROCEDURE — G8510 SCR DEP NEG, NO PLAN REQD: HCPCS | Performed by: INTERNAL MEDICINE

## 2021-08-19 PROCEDURE — G8427 DOCREV CUR MEDS BY ELIG CLIN: HCPCS | Performed by: INTERNAL MEDICINE

## 2021-08-19 NOTE — PROGRESS NOTES
Verified name and birth date for privacy precautions. Chart reviewed in preparation for today's visit. Chief Complaint   Patient presents with    Hypertension          Health Maintenance Due   Topic    Shingrix Vaccine Age 50> (1 of 2)         Wt Readings from Last 3 Encounters:   08/19/21 115 lb (52.2 kg)   04/19/21 113 lb 6.4 oz (51.4 kg)   03/18/21 113 lb (51.3 kg)     Temp Readings from Last 3 Encounters:   08/19/21 97.8 °F (36.6 °C) (Temporal)   04/19/21 97.5 °F (36.4 °C) (Temporal)   03/18/21 97.5 °F (36.4 °C) (Temporal)     BP Readings from Last 3 Encounters:   08/19/21 (!) 156/66   04/19/21 (!) 140/70   03/18/21 (!) 159/76     Pulse Readings from Last 3 Encounters:   08/19/21 77   04/19/21 80   03/18/21 85         Learning Assessment:  :     Learning Assessment 2/11/2019 7/6/2015 4/24/2014   PRIMARY LEARNER Patient Patient Patient   HIGHEST LEVEL OF EDUCATION - PRIMARY LEARNER  GRADUATED HIGH SCHOOL OR GED GRADUATED HIGH SCHOOL OR GED GRADUATED HIGH SCHOOL OR GED   BARRIERS PRIMARY LEARNER NONE NONE NONE   CO-LEARNER CAREGIVER No No No   PRIMARY LANGUAGE ENGLISH ENGLISH ENGLISH    NEED - No No   LEARNER PREFERENCE PRIMARY READING DEMONSTRATION DEMONSTRATION     LISTENING - -   LEARNING SPECIAL TOPICS - no no   ANSWERED BY patient patient patient   RELATIONSHIP SELF SELF SELF       Depression Screening:  :     3 most recent PHQ Screens 8/19/2021   Little interest or pleasure in doing things Not at all   Feeling down, depressed, irritable, or hopeless Not at all   Total Score PHQ 2 0       Fall Risk Assessment:  :     Fall Risk Assessment, last 12 mths 8/19/2021   Able to walk? Yes   Fall in past 12 months? 0   Do you feel unsteady? 0   Are you worried about falling 0   Number of falls in past 12 months -   Fall with injury? -       Abuse Screening:  :     Abuse Screening Questionnaire 8/19/2021 8/3/2020 2/11/2019 7/6/2015 4/24/2014   Do you ever feel afraid of your partner?  N N N N N   Are you in a relationship with someone who physically or mentally threatens you? N N N N N   Is it safe for you to go home?  Corey Kerr

## 2021-08-19 NOTE — PROGRESS NOTES
Assessment/Plan:     1. Benign essential hypertension  -I evaluated and recommended to continue current doses of medications.     - METABOLIC PANEL, COMPREHENSIVE; Future  - CBC WITH AUTOMATED DIFF; Future  - CBC WITH AUTOMATED DIFF  - METABOLIC PANEL, COMPREHENSIVE    2. Hypothyroidism, adult  -appears clinically euthyroid.  -compliant with use of her levothyroxine.     - T4, FREE; Future  - TSH 3RD GENERATION; Future  - TSH 3RD GENERATION  - T4, FREE    3. Encounter for long-term (current) use of medications      4. Medicare annual wellness visit, subsequent  -completed today.   -[facility] decision maker reviewed with patient and updated. - DEPRESSION SCREEN ANNUAL    5. Screening for depression    - DEPRESSION SCREEN ANNUAL     Orders Placed This Encounter   [address] 8-15 MIN    T4, FREE     Standing Status:   Future     Number of Occurrences:   1     Standing Expiration Date:   8/19/2022    TSH 3RD GENERATION     Standing Status:   Future     Number of Occurrences:   1     Standing Expiration Date:   1/42/4461    METABOLIC PANEL, COMPREHENSIVE     Standing Status:   Future     Number of Occurrences:   1     Standing Expiration Date:   8/19/2022    CBC WITH AUTOMATED DIFF     Standing Status:   Future     Number of Occurrences:   1     Standing Expiration Date:   8/19/2022        Follow-up Disposition:       Follow up in 6 months             Subjective:      Arianna Soni is a 80 y.o. female who presents today for follow up of her hypertension and hypothyroid. Since last visit:  -has increased gas production. This is her only new concern.    -lives independently. Still driving. Objective:      Wt Readings from Last 3 Encounters:   04/19/21 113 lb 6.4 oz (51.4 kg)   03/18/21 113 lb (51.3 kg)   12/28/20 111 lb 12.8 oz (50.7 kg)     BP Readings from Last 3 Encounters:   04/19/21 (!) 140/70   03/18/21 (!) 159/76   01/05/21 (!) 181/98     Visit Vitals  /72   Pulse 77 Temp 97.8 °F (36.6 °C) (Temporal)   Resp 16   Ht 5' 1\" (1.549 m) Comment: obtained at previous visit   Wt 115 lb (52.2 kg)   SpO2 98%   BMI 21.73 kg/m²     General appearance: alert, cooperative, no distress, appears stated age  Head: Normocephalic, without obvious abnormality, atraumatic  Neck: supple, symmetrical, trachea midline, no adenopathy, no carotid bruit and no JVD  Lungs: clear to auscultation bilaterally  Heart: regular rate and rhythm, S1, S2 normal, no murmur, click, rub or gallop  Extremities: extremities normal, atraumatic, no cyanosis or edema              Disclaimer:  Return if symptoms worsen or fail to improve. Advised patient to call back or return to office if symptoms worsen/change/persist.     Discussed expected course/resolution/complications of diagnosis in detail with patient. Medication risks/benefits/costs/interactions/alternatives discussed with patient. Patient was given an after visit summary which includes diagnoses, current medications, & vitals. Patient expressed understanding with the diagnosis and plan. This is the Subsequent Medicare Annual Wellness Exam, performed 12 months or more after the Initial AWV or the last Subsequent AWV    I have reviewed the patient's medical history in detail and updated the computerized patient record. Assessment/Plan   Education and counseling provided:  Are appropriate based on today's review and evaluation    1. Medicare annual wellness visit, subsequent  -     Ryan 68  2. Benign essential hypertension  -     METABOLIC PANEL, COMPREHENSIVE; Future  -     CBC WITH AUTOMATED DIFF; Future  3. Hypothyroidism, adult  -     T4, FREE; Future  -     TSH 3RD GENERATION; Future  4. Encounter for long-term (current) use of medications  5.  Screening for depression  -     DEPRESSION SCREEN ANNUAL       Depression Risk Factor Screening     3 most recent PHQ Screens 8/19/2021   Little interest or pleasure in doing things Not at all   Feeling down, depressed, irritable, or hopeless Not at all   Total Score PHQ 2 0       Alcohol Risk Screen    Do you average more than 1 drink per night or more than 7 drinks a week:  No    On any one occasion in the past three months have you have had more than 3 drinks containing alcohol:  No        Functional Ability and Level of Safety    Hearing: Hearing is good. Activities of Daily Living: The home contains: handrails and grab bars  Patient does total self care      Ambulation: with no difficulty     Fall Risk:  Fall Risk Assessment, last 12 mths 8/19/2021   Able to walk? Yes   Fall in past 12 months? 0   Do you feel unsteady? 0   Are you worried about falling 0   Number of falls in past 12 months -   Fall with injury?  -      Abuse Screen:  Patient is not abused       Cognitive Screening    Has your family/caregiver stated any concerns about your memory: no         Health Maintenance Due     Health Maintenance Due   Topic Date Due    Shingrix Vaccine Age 49> (1 of 2) Never done       Patient Care Team   Patient Care Team:  Mildred Cintron MD as PCP - General (Internal Medicine)  Mildred Cintron MD as PCP [facility]  Inna Larios MD as Physician (Orthopedic Surgery)  Cherylene Pam, MD as Physician (Ophthalmology)    History     Patient Active Problem List   Diagnosis Code    Colon polyp K63.5    Elevated cholesterol E78.00    Benign essential hypertension I10    Acquired hypothyroidism E03.9    Arthritis of both knees M17.0    TB (tuberculosis), treated A15.9    Vitamin D deficiency E55.9     Past Medical History:   Diagnosis Date    Hypercholesterolemia     Hypertension     Thyroid disease     hypothyroid      Past Surgical History:   Procedure Laterality Date    ENDOSCOPY, COLON, DIAGNOSTIC  2001    HX BACK SURGERY  1984    HX BREAST BIOPSY  2010    duct removal, benign    HX DILATION AND CURETTAGE      HX HYSTERECTOMY  1980 +BSO    HX OPEN REDUCTION INTERNAL FIXATION  1996    L forearm    VASCULAR SURGERY PROCEDURE UNLIST  1960s    vein stripping-bilat     Current Outpatient Medications   Medication Sig Dispense Refill    losartan (COZAAR) 50 mg tablet TAKE ONE TABLET DAILY 90 Tab 1    metoprolol succinate (TOPROL-XL) 25 mg XL tablet Take 1 Tab by mouth daily. 90 Tab 1    levothyroxine (SYNTHROID) 75 mcg tablet TAKE 1 TABLET ONCE A DAY ON 6 DAYS OF EACH WEEK. 90 Tab 0    nabumetone (RELAFEN) 500 mg tablet Take  by mouth two (2) times daily as needed for Pain.  vit A/vit C/vit E/zinc/copper (ICAPS AREDS PO) Take  by mouth daily.  cholecalciferol (VITAMIN D3) 1,000 unit tablet Take 2,000 Units by mouth daily.        Allergies   Allergen Reactions    Morphine Nausea and Vomiting     Did not relieve pain and couldn't sleep and caused vomiting      Cortisone Other (comments)     Injection-she had trouble walking after injection - worsening of movement       Family History   Problem Relation Age of Onset    Cancer Father         liver    Colon Cancer Mother     Cancer Mother         colon    Cancer Sister         liver    Cancer Brother         liver    Colon Polyps Sister     Thyroid Cancer Daughter      Social History     Tobacco Use    Smoking status: Never Smoker    Smokeless tobacco: Never Used   Substance Use Topics    Alcohol use: No         Colleen Bell MD

## 2021-08-22 NOTE — PATIENT INSTRUCTIONS

## 2022-02-01 ENCOUNTER — OFFICE VISIT (OUTPATIENT)
Dept: INTERNAL MEDICINE CLINIC | Age: 87
End: 2022-02-01
Payer: COMMERCIAL

## 2022-02-01 VITALS
WEIGHT: 116.2 LBS | BODY MASS INDEX: 21.94 KG/M2 | OXYGEN SATURATION: 97 % | TEMPERATURE: 97 F | SYSTOLIC BLOOD PRESSURE: 140 MMHG | HEART RATE: 75 BPM | RESPIRATION RATE: 15 BRPM | HEIGHT: 61 IN | DIASTOLIC BLOOD PRESSURE: 78 MMHG

## 2022-02-01 DIAGNOSIS — I10 BENIGN ESSENTIAL HYPERTENSION: Primary | ICD-10-CM

## 2022-02-01 DIAGNOSIS — Z79.899 ENCOUNTER FOR LONG-TERM CURRENT USE OF MEDICATION: ICD-10-CM

## 2022-02-01 DIAGNOSIS — R10.13 DYSPEPSIA: ICD-10-CM

## 2022-02-01 DIAGNOSIS — E03.9 ACQUIRED HYPOTHYROIDISM: ICD-10-CM

## 2022-02-01 PROCEDURE — 1090F PRES/ABSN URINE INCON ASSESS: CPT | Performed by: INTERNAL MEDICINE

## 2022-02-01 PROCEDURE — 1101F PT FALLS ASSESS-DOCD LE1/YR: CPT | Performed by: INTERNAL MEDICINE

## 2022-02-01 PROCEDURE — G8536 NO DOC ELDER MAL SCRN: HCPCS | Performed by: INTERNAL MEDICINE

## 2022-02-01 PROCEDURE — G8420 CALC BMI NORM PARAMETERS: HCPCS | Performed by: INTERNAL MEDICINE

## 2022-02-01 PROCEDURE — G8427 DOCREV CUR MEDS BY ELIG CLIN: HCPCS | Performed by: INTERNAL MEDICINE

## 2022-02-01 PROCEDURE — 99214 OFFICE O/P EST MOD 30 MIN: CPT | Performed by: INTERNAL MEDICINE

## 2022-02-01 PROCEDURE — G8432 DEP SCR NOT DOC, RNG: HCPCS | Performed by: INTERNAL MEDICINE

## 2022-02-01 RX ORDER — LEVOTHYROXINE SODIUM 75 UG/1
75 TABLET ORAL
Qty: 90 TABLET | Refills: 1 | Status: SHIPPED | OUTPATIENT
Start: 2022-02-01 | End: 2022-08-01

## 2022-02-01 RX ORDER — LOSARTAN POTASSIUM 50 MG/1
TABLET ORAL
Qty: 90 TABLET | Refills: 1 | Status: SHIPPED | OUTPATIENT
Start: 2022-02-01 | End: 2022-08-01

## 2022-02-01 NOTE — PROGRESS NOTES
Assessment/Plan:     1. Benign essential hypertension  -I evaluated and recommended to continue current doses of medications. - CBC WITH AUTOMATED DIFF; Future  - METABOLIC PANEL, COMPREHENSIVE; Future  - TSH 3RD GENERATION; Future    2. Encounter for long-term current use of medication    - CBC WITH AUTOMATED DIFF; Future  - METABOLIC PANEL, COMPREHENSIVE; Future  - TSH 3RD GENERATION; Future  - T4, FREE; Future    3. Dyspepsia  -may use pepcid 20mg daily as needed  -try to keep log of foods that exacerbate symptoms. May need to avoid trigger foods. 4. Acquired hypothyroidism  -appears clinically euthyroid. -need TSH today. - levothyroxine (SYNTHROID) 75 mcg tablet; Take 1 Tablet by mouth Daily (before breakfast). Dispense: 90 Tablet; Refill: 1     Orders Placed This Encounter    CBC WITH AUTOMATED DIFF     Standing Status:   Future     Number of Occurrences:   1     Standing Expiration Date:   0/2/3616    METABOLIC PANEL, COMPREHENSIVE     Standing Status:   Future     Number of Occurrences:   1     Standing Expiration Date:   2/1/2023    TSH 3RD GENERATION     Standing Status:   Future     Number of Occurrences:   1     Standing Expiration Date:   2/1/2023    T4, FREE     Standing Status:   Future     Number of Occurrences:   1     Standing Expiration Date:   2/1/2023    losartan (COZAAR) 50 mg tablet     Sig: TAKE ONE TABLET DAILY     Dispense:  90 Tablet     Refill:  1    levothyroxine (SYNTHROID) 75 mcg tablet     Sig: Take 1 Tablet by mouth Daily (before breakfast). Dispense:  90 Tablet     Refill:  1        Follow-up Disposition:       Follow up in 6 months             Subjective:      Jimmy Beckett is a 80 y.o. female who presents today for follow up of her hypertension and hypothyroid. Since last visit 8/19/2021:  -has intermittent 'stomach grumbling'. Not related to any foods that she is aware of. She also notes more gas production. Occasional loose stool.   No blood  -otherwise doing well. Still very independent. Objective: Wt Readings from Last 3 Encounters:   02/01/22 116 lb 3.2 oz (52.7 kg)   08/19/21 115 lb (52.2 kg)   04/19/21 113 lb 6.4 oz (51.4 kg)     BP Readings from Last 3 Encounters:   02/01/22 (!) 140/78   08/19/21 125/72   04/19/21 (!) 140/70     Visit Vitals  BP (!) 140/78   Pulse 75   Temp 97 °F (36.1 °C) (Temporal)   Resp 15   Ht 5' 1\" (1.549 m)   Wt 116 lb 3.2 oz (52.7 kg)   SpO2 97%   BMI 21.96 kg/m²     General appearance: alert, cooperative, no distress, appears stated age  Head: Normocephalic, without obvious abnormality, atraumatic  Neck: supple, symmetrical, trachea midline, no adenopathy, no carotid bruit and no JVD, thyroid - no masses, tenderness or enlargement  Lungs: clear to auscultation bilaterally  Heart: regular rate and rhythm, S1, S2 normal, no murmur, click, rub or gallop  Abdomen: soft, non-tender. Bowel sounds normal. No masses,  no organomegaly  Extremities: extremities normal, atraumatic, no cyanosis or edema  Pulses: 2+ and symmetric              Disclaimer:  Return if symptoms worsen or fail to improve. Advised patient to call back or return to office if symptoms worsen/change/persist.     Discussed expected course/resolution/complications of diagnosis in detail with patient. Medication risks/benefits/costs/interactions/alternatives discussed with patient. Patient was given an after visit summary which includes diagnoses, current medications, & vitals. Patient expressed understanding with the diagnosis and plan.

## 2022-08-04 ENCOUNTER — OFFICE VISIT (OUTPATIENT)
Dept: INTERNAL MEDICINE CLINIC | Age: 87
End: 2022-08-04
Payer: MEDICARE

## 2022-08-04 VITALS
HEIGHT: 61 IN | OXYGEN SATURATION: 95 % | SYSTOLIC BLOOD PRESSURE: 140 MMHG | TEMPERATURE: 98.4 F | RESPIRATION RATE: 16 BRPM | HEART RATE: 84 BPM | WEIGHT: 115 LBS | BODY MASS INDEX: 21.71 KG/M2 | DIASTOLIC BLOOD PRESSURE: 70 MMHG

## 2022-08-04 DIAGNOSIS — Z13.31 SCREENING FOR DEPRESSION: ICD-10-CM

## 2022-08-04 DIAGNOSIS — R40.4 TRANSIENT ALTERATION OF AWARENESS: ICD-10-CM

## 2022-08-04 DIAGNOSIS — E03.9 ACQUIRED HYPOTHYROIDISM: ICD-10-CM

## 2022-08-04 DIAGNOSIS — Z00.00 MEDICARE ANNUAL WELLNESS VISIT, SUBSEQUENT: Primary | ICD-10-CM

## 2022-08-04 DIAGNOSIS — Z79.899 ENCOUNTER FOR LONG-TERM CURRENT USE OF MEDICATION: ICD-10-CM

## 2022-08-04 DIAGNOSIS — I10 BENIGN ESSENTIAL HYPERTENSION: ICD-10-CM

## 2022-08-04 PROBLEM — N18.30 CHRONIC RENAL DISEASE, STAGE III (HCC): Status: ACTIVE | Noted: 2022-08-04

## 2022-08-04 PROCEDURE — G8536 NO DOC ELDER MAL SCRN: HCPCS | Performed by: INTERNAL MEDICINE

## 2022-08-04 PROCEDURE — G8420 CALC BMI NORM PARAMETERS: HCPCS | Performed by: INTERNAL MEDICINE

## 2022-08-04 PROCEDURE — G0444 DEPRESSION SCREEN ANNUAL: HCPCS | Performed by: INTERNAL MEDICINE

## 2022-08-04 PROCEDURE — 99213 OFFICE O/P EST LOW 20 MIN: CPT | Performed by: INTERNAL MEDICINE

## 2022-08-04 PROCEDURE — 1090F PRES/ABSN URINE INCON ASSESS: CPT | Performed by: INTERNAL MEDICINE

## 2022-08-04 PROCEDURE — G8510 SCR DEP NEG, NO PLAN REQD: HCPCS | Performed by: INTERNAL MEDICINE

## 2022-08-04 PROCEDURE — 1101F PT FALLS ASSESS-DOCD LE1/YR: CPT | Performed by: INTERNAL MEDICINE

## 2022-08-04 PROCEDURE — G8427 DOCREV CUR MEDS BY ELIG CLIN: HCPCS | Performed by: INTERNAL MEDICINE

## 2022-08-04 PROCEDURE — G0439 PPPS, SUBSEQ VISIT: HCPCS | Performed by: INTERNAL MEDICINE

## 2022-08-04 RX ORDER — CICLOPIROX 80 MG/ML
SOLUTION TOPICAL
COMMUNITY
Start: 2022-06-10

## 2022-08-04 NOTE — PROGRESS NOTES
Verified name and birth date for privacy precautions. Chart reviewed in preparation for today's visit. Chief Complaint   Patient presents with    Thyroid Problem    Memory Loss          Health Maintenance Due   Topic    Shingrix Vaccine Age 50> (1 of 2)    COVID-19 Vaccine (4 - Booster for Moderna series)    Medicare Yearly Exam          Wt Readings from Last 3 Encounters:   08/04/22 115 lb (52.2 kg)   02/01/22 116 lb 3.2 oz (52.7 kg)   08/19/21 115 lb (52.2 kg)     Temp Readings from Last 3 Encounters:   08/04/22 98.4 °F (36.9 °C) (Temporal)   02/01/22 97 °F (36.1 °C) (Temporal)   08/19/21 97.8 °F (36.6 °C) (Temporal)     BP Readings from Last 3 Encounters:   08/04/22 (!) 140/70   02/01/22 (!) 140/78   08/19/21 125/72     Pulse Readings from Last 3 Encounters:   08/04/22 84   02/01/22 75   08/19/21 77         Learning Assessment:  :     Learning Assessment 2/11/2019 7/6/2015 4/24/2014   PRIMARY LEARNER Patient Patient Patient   HIGHEST LEVEL OF EDUCATION - PRIMARY LEARNER  GRADUATED HIGH SCHOOL OR GED GRADUATED HIGH SCHOOL OR GED GRADUATED HIGH SCHOOL OR GED   BARRIERS PRIMARY LEARNER NONE NONE NONE   CO-LEARNER CAREGIVER No No No   PRIMARY LANGUAGE ENGLISH ENGLISH ENGLISH    NEED - No No   LEARNER PREFERENCE PRIMARY READING DEMONSTRATION DEMONSTRATION     LISTENING - -   LEARNING SPECIAL TOPICS - no no   ANSWERED BY patient patient patient   RELATIONSHIP SELF SELF SELF       Depression Screening:  :     3 most recent PHQ Screens 8/4/2022   Little interest or pleasure in doing things Not at all   Feeling down, depressed, irritable, or hopeless Not at all   Total Score PHQ 2 0       Fall Risk Assessment:  :     Fall Risk Assessment, last 12 mths 8/4/2022   Able to walk? Yes   Fall in past 12 months? 1   Do you feel unsteady? 0   Are you worried about falling 1   Number of falls in past 12 months 1   Fall with injury?  0       Abuse Screening:  :     Abuse Screening Questionnaire 8/4/2022 8/19/2021 8/3/2020 2/11/2019 7/6/2015 4/24/2014   Do you ever feel afraid of your partner? N N N N N N   Are you in a relationship with someone who physically or mentally threatens you? N N N N N N   Is it safe for you to go home?  Pietro No

## 2022-08-04 NOTE — PROGRESS NOTES
Assessment/Plan:     1. Acquired hypothyroidism  -patient compliant and taking her levothyroxine fasting in the morning.     - T4, FREE; Future  - TSH 3RD GENERATION; Future  - METABOLIC PANEL, COMPREHENSIVE; Future  - CBC WITH AUTOMATED DIFF; Future    2. Benign essential hypertension  -I evaluated and recommended to continue current doses of medications.     - METABOLIC PANEL, COMPREHENSIVE; Future  - CBC WITH AUTOMATED DIFF; Future    3. Encounter for long-term current use of medication      4. Medicare annual wellness visit, subsequent  -completed today,   -[facility] decision maker reviewed with patient and updated. - she states that you can call any of her 5 children, just didn't have their phone numbers with her.     - Ryan Messer    5. Screening for depression    - DEPRESSION SCREEN ANNUAL    6. Transient alteration of awareness  -episode of loss of memory for a brief period of time. She is back to her baseline. She was able to give me history today. Appears very alert and oriented today on exam.   -will check labs and head CT.     - CT HEAD W WO CONT; Future     Orders Placed This Encounter    ANNUAL DEPRESSION SCREEN 8-15 MIN    CT HEAD W WO CONT     Standing Status:   Future     Standing Expiration Date:   9/4/2023     Order Specific Question:   STAT Creatinine as indicated     Answer:   Yes    T4, FREE     Standing Status:   Future     Number of Occurrences:   1     Standing Expiration Date:   8/4/2023    TSH 3RD GENERATION     Standing Status:   Future     Number of Occurrences:   1     Standing Expiration Date:   3/9/2775    METABOLIC PANEL, COMPREHENSIVE     Standing Status:   Future     Number of Occurrences:   1     Standing Expiration Date:   8/4/2023    CBC WITH AUTOMATED DIFF     Standing Status:   Future     Number of Occurrences:   1     Standing Expiration Date:   8/4/2023    ciclopirox (PENLAC) 8 % solution        Follow-up Disposition:     follow up 4 months. Subjective:      Britton Damico is a 80 y.o. female who presents today for follow up of her hypothyroid and hypertension. She is also here for her Medicare Wellness Exam     Since last visit :  - left rib pain- lasts seconds. Started a couple days ago. No trauma. Doesn't keep her from sleeping.     -she had an episode a couple of weeks ago where she was standing in her living room ready to do her finances and then she 'blanked'. She states the next thing she remembers was becoming aware of her surrounds, but she was now sitting in her 'comfy' chair in her living room. She doesn't remember anything she did for a short period of time. She states that she still can't find her check book. She denies any chest pain. She doesn't know if she fainted. There are not injuries or pain. She has not been lightheaded.     -her podiatrist gave her penlac to use on her toenails. Objective: Wt Readings from Last 3 Encounters:   08/04/22 115 lb (52.2 kg)   02/01/22 116 lb 3.2 oz (52.7 kg)   08/19/21 115 lb (52.2 kg)     BP Readings from Last 3 Encounters:   08/04/22 (!) 140/70   02/01/22 (!) 140/78   08/19/21 125/72     Visit Vitals  BP (!) 140/70 (BP 1 Location: Left upper arm, BP Patient Position: Sitting, BP Cuff Size: Adult) Comment: manual   Pulse 84   Temp 98.4 °F (36.9 °C) (Temporal)   Resp 16   Ht 5' 1\" (1.549 m)   Wt 115 lb (52.2 kg)   SpO2 95%   BMI 21.73 kg/m²     General appearance: alert, cooperative, no distress, appears stated age  Head: Normocephalic, without obvious abnormality, atraumatic  Eyes: negative  Ears: normal TM's and external ear canals AU  Neck: supple, symmetrical, trachea midline, no adenopathy, thyroid: not enlarged, symmetric, no tenderness/mass/nodules, no carotid bruit, and no JVD  Lungs: clear to auscultation bilaterally  Heart: regular rate and rhythm, S1, S2 normal, no murmur, click, rub or gallop  Abdomen: soft, non-tender.  Bowel sounds normal. No masses, no organomegaly  Extremities: extremities normal, atraumatic, no cyanosis or edema, varicose veins noted  Pulses: 2+ and symmetric              Disclaimer:  Return if symptoms worsen or fail to improve. Advised patient to call back or return to office if symptoms worsen/change/persist.     Discussed expected course/resolution/complications of diagnosis in detail with patient. Medication risks/benefits/costs/interactions/alternatives discussed with patient. Patient was given an after visit summary which includes diagnoses, current medications, & vitals. Patient expressed understanding with the diagnosis and plan. This is the Subsequent Medicare Annual Wellness Exam, performed 12 months or more after the Initial AWV or the last Subsequent AWV    I have reviewed the patient's medical history in detail and updated the computerized patient record. Assessment/Plan   Education and counseling provided:  Are appropriate based on today's review and evaluation    1. Medicare annual wellness visit, subsequent  -     [address]  2. Acquired hypothyroidism  3. Benign essential hypertension  4. Encounter for long-term current use of medication  5. Encounter for long-term (current) use of medications  6. Screening for depression  -     DEPRESSION SCREEN ANNUAL       Depression Risk Factor Screening     3 most recent PHQ Screens 8/4/2022   Little interest or pleasure in doing things Not at all   Feeling down, depressed, irritable, or hopeless Not at all   Total Score PHQ 2 0       Alcohol & Drug Abuse Risk Screen    Do you average more than 1 drink per night or more than 7 drinks a week:  No    On any one occasion in the past three months have you have had more than 3 drinks containing alcohol:  No          Functional Ability and Level of Safety    Hearing: Hearing is good. Activities of Daily Living:   The home contains: handrails and grab bars  Patient does total self care      Ambulation: with no difficulty     Fall Risk:  Fall Risk Assessment, last 12 mths 8/4/2022   Able to walk? Yes   Fall in past 12 months? 1   Do you feel unsteady? 0   Are you worried about falling 1   Number of falls in past 12 months 1   Fall with injury?  0      Abuse Screen:  Patient is not abused       Cognitive Screening    Has your family/caregiver stated any concerns about your memory: no         Health Maintenance Due     Health Maintenance Due   Topic Date Due    Shingrix Vaccine Age 49> (1 of 2) Never done    COVID-19 Vaccine (4 - Booster for Moderna series) 06/02/2022       Patient Care Team   Patient Care Team:  Jerrod Portillo MD as PCP - General (Internal Medicine Physician)  [facility]  Adeline Powell MD as Physician (Orthopedic Surgery)  Maria Elena Heath MD as Physician (Ophthalmology)    History     Patient Active Problem List   Diagnosis Code    Colon polyp K63.5    Elevated cholesterol E78.00    Benign essential hypertension I10    Acquired hypothyroidism E03.9    Arthritis of both knees M17.0    TB (tuberculosis), treated A15.9    Vitamin D deficiency E55.9    Chronic renal disease, stage III N18.30     Past Medical History:   Diagnosis Date    Hypercholesterolemia     Hypertension     Thyroid disease     hypothyroid      Past Surgical History:   Procedure Laterality Date    ENDOSCOPY, COLON, DIAGNOSTIC  2001    HX BACK SURGERY  1984    HX BREAST BIOPSY  2010    duct removal, benign    HX DILATION AND CURETTAGE      HX HYSTERECTOMY  1980 +BSO    HX OPEN REDUCTION INTERNAL FIXATION  1996    L forearm    VASCULAR SURGERY PROCEDURE UNLIST  1960s    vein stripping-bilat     Current Outpatient Medications   Medication Sig Dispense Refill    ciclopirox (PENLAC) 8 % solution       levothyroxine (SYNTHROID) 75 mcg tablet TAKE ONE TABLET EVERY MORNING BEFORE BREAKFAST 90 Tablet 0    losartan (COZAAR) 50 mg tablet TAKE ONE TABLET DAILY 90 Tablet 0    metoprolol succinate (TOPROL-XL) 25 mg XL tablet TAKE ONE TABLET DAILY 90 Tablet 1    vit A/vit C/vit E/zinc/copper (ICAPS AREDS PO) Take  by mouth daily. cholecalciferol (VITAMIN D3) (1000 Units /25 mcg) tablet Take 2,000 Units by mouth daily.        Allergies   Allergen Reactions    Morphine Nausea and Vomiting     Did not relieve pain and couldn't sleep and caused vomiting      Cortisone Other (comments)     Injection-she had trouble walking after injection - worsening of movement       Family History   Problem Relation Age of Onset    Cancer Father         liver    Colon Cancer Mother     Cancer Mother         colon    Cancer Sister         liver    Cancer Brother         liver    Colon Polyps Sister     Thyroid Cancer Daughter      Social History     Tobacco Use    Smoking status: Never    Smokeless tobacco: Never   Substance Use Topics    Alcohol use: No         Autumn Paula MD

## 2022-08-04 NOTE — PATIENT INSTRUCTIONS
Medicare Wellness Visit, Female     The best way to live healthy is to have a lifestyle where you eat a well-balanced diet, exercise regularly, limit alcohol use, and quit all forms of tobacco/nicotine, if applicable. Regular preventive services are another way to keep healthy. Preventive services (vaccines, screening tests, monitoring & exams) can help personalize your care plan, which helps you manage your own care. Screening tests can find health problems at the earliest stages, when they are easiest to treat. [facility] follows the current, evidence-based guidelines published by the [facility]dan Baird ([facility]) when recommending preventive services for our patients. Because we follow these guidelines, sometimes recommendations change over time as research supports it. (For example, mammograms used to be recommended annually. Even though Medicare will still pay for an annual mammogram, the newer guidelines recommend a mammogram every two years for women of average risk). Of course, you and your doctor may decide to screen more often for some diseases, based on your risk and your co-morbidities (chronic disease you are already diagnosed with). Preventive services for you include:  - Medicare offers their members a free annual wellness visit, which is time for you and your primary care provider to discuss and plan for your preventive service needs. Take advantage of this benefit every year!  -All adults over the age of 72 should receive the recommended pneumonia vaccines. Current USPSTF guidelines recommend a series of two vaccines for the best pneumonia protection.   -All adults should have a flu vaccine yearly and a tetanus vaccine every 10 years.   -All adults age 48 and older should receive the shingles vaccines (series of two vaccines).       -All adults age 38-68 who are overweight should have a diabetes screening test once every three years.   -All adults born between [birth year] and 1965 should be screened once for Hepatitis C.  -Other screening tests and preventive services for persons with diabetes include: an eye exam to screen for diabetic retinopathy, a kidney function test, a foot exam, and stricter control over your cholesterol.   -Cardiovascular screening for adults with routine risk involves an electrocardiogram (ECG) at intervals determined by your doctor.   -Colorectal cancer screenings should be done for adults age 54-65 with no increased risk factors for colorectal cancer. There are a number of acceptable methods of screening for this type of cancer. Each test has its own benefits and drawbacks. Discuss with your doctor what is most appropriate for you during your annual wellness visit. The different tests include: colonoscopy (considered the best screening method), a fecal occult blood test, a fecal DNA test, and sigmoidoscopy.    -A bone mass density test is recommended when a woman turns 65 to screen for osteoporosis. This test is only recommended one time, as a screening. Some providers will use this same test as a disease monitoring tool if you already have osteoporosis. -Breast cancer screenings are recommended every other year for women of normal risk, age 54-69.  -Cervical cancer screenings for women over age 72 are only recommended with certain risk factors. Appointment for Heat CT image is August 9, 2022 - 4:30pm  -go to Medical office [facility] - outpatient registration. On ground floor   -stop eating at noon.   Drink plenty of water

## 2022-08-16 ENCOUNTER — HOSPITAL ENCOUNTER (OUTPATIENT)
Dept: CT IMAGING | Age: 87
Discharge: HOME | End: 2022-08-16
Attending: INTERNAL MEDICINE
Payer: MEDICARE

## 2022-08-16 DIAGNOSIS — R40.4 TRANSIENT ALTERATION OF AWARENESS: ICD-10-CM

## 2022-08-16 PROCEDURE — 74011000636 HC RX REV CODE- 636: Performed by: RADIOLOGY

## 2022-08-16 PROCEDURE — 70470 CT HEAD/BRAIN W/O & W/DYE: CPT

## 2022-08-16 RX ADMIN — IOPAMIDOL 100 ML: 612 INJECTION, SOLUTION INTRAVENOUS at 13:20

## 2022-08-29 ENCOUNTER — TELEPHONE (OUTPATIENT)
Dept: INTERNAL MEDICINE CLINIC | Age: 87
End: 2022-08-29

## 2022-08-29 NOTE — TELEPHONE ENCOUNTER
Reason for call:    Patient called wanting to know about the results from her head CT on 8/16/22    Is this a new problem: yes     Date of last appointment:  8/4/2022     Can we respond via [employer]: no    Best call back number:     Howard Cha - [phone]

## 2022-08-30 NOTE — TELEPHONE ENCOUNTER
Advised pt MD has sent her a letter with results of her CT of Head - read to her message - Your Head imaging study is normal.  No masses or any significant abnormalities noted.

## 2023-01-25 DIAGNOSIS — E03.9 ACQUIRED HYPOTHYROIDISM: ICD-10-CM

## 2023-01-25 DIAGNOSIS — I10 BENIGN ESSENTIAL HYPERTENSION: ICD-10-CM

## 2023-01-25 NOTE — TELEPHONE ENCOUNTER
Patient had refills for medication at [facility], which has closed. She would like prescriptions sent to her new pharmacy. Requested Prescriptions     Pending Prescriptions Disp Refills    levothyroxine (SYNTHROID) 75 mcg tablet 90 Tablet 1    losartan (COZAAR) 50 mg tablet 90 Tablet 1     Si Tablet daily. metoprolol succinate (TOPROL-XL) 25 mg XL tablet 90 Tablet 1     Si Tablet daily.      NEW PHARMACY:    [facility] Kane Doherty  [phone]    Licha Gardner  [phone]

## 2023-01-26 RX ORDER — LEVOTHYROXINE SODIUM 75 UG/1
TABLET ORAL
Qty: 90 TABLET | Refills: 1 | Status: SHIPPED | OUTPATIENT
Start: 2023-01-26

## 2023-01-26 RX ORDER — METOPROLOL SUCCINATE 25 MG/1
TABLET, EXTENDED RELEASE ORAL
Qty: 90 TABLET | Refills: 1 | Status: SHIPPED | OUTPATIENT
Start: 2023-01-26

## 2023-01-26 RX ORDER — LOSARTAN POTASSIUM 50 MG/1
50 TABLET ORAL DAILY
Qty: 90 TABLET | Refills: 1 | Status: SHIPPED | OUTPATIENT
Start: 2023-01-26

## 2023-03-13 NOTE — PROGRESS NOTES
Assessment/Plan:     1. Acquired hypothyroidism  -has lost some weight.   -states compliance with use oflevothyroxine 75mcg daily.   -check labs today. - CBC WITH AUTOMATED DIFF; Future  - METABOLIC PANEL, COMPREHENSIVE; Future  - TSH 3RD GENERATION; Future  - T4, FREE; Future    2. Stage 3 chronic kidney disease, unspecified whether stage 3a or 3b CKD ([facility] [address]  -will check renal function.     - METABOLIC PANEL, COMPREHENSIVE; Future    3. Benign essential hypertension  -controlled with use of metoprolol 25mg daily and losartan 50mg daily.     - CBC WITH AUTOMATED DIFF; Future  - METABOLIC PANEL, COMPREHENSIVE; Future    4. Encounter for long-term current use of medication    5. Arthritis  -recommended she may use tylenol arthritis 1-2 tab every 8 hours as needed for pain. Orders Placed This Encounter    CBC WITH AUTOMATED DIFF     Standing Status:   Future     Number of Occurrences:   1     Standing Expiration Date:   8/18/4466    METABOLIC PANEL, COMPREHENSIVE     Standing Status:   Future     Number of Occurrences:   1     Standing Expiration Date:   3/14/2024    TSH 3RD GENERATION     Standing Status:   Future     Number of Occurrences:   1     Standing Expiration Date:   3/14/2024    T4, FREE     Standing Status:   Future     Number of Occurrences:   1     Standing Expiration Date:   3/14/2024             Follow-up Disposition:     follow up 6 months. Subjective:      Chiara Jose is a 80 y.o. female who presents today for follow up of her hypothyroid and hypertension. Since last visit 8/4/2022:  -still living on [address] [place] [address] living  Still driving. Does not drive in the dark.     -has lost some weight, but she states that she is eating her meals. She wakes up late and has a breakfast/lunch in one meal, and a late dinner.      -she has 4 children. Her daughters live in [place]. One son who lives in [facility] Age range of her children are 64-70.  She states that they check on her daily.     -she has some arthritis pain, but doesn't like taking any medications for it.     -she states compliance with use of her medications.     -ambulates without assistance. Objective: Wt Readings from Last 3 Encounters:   08/04/22 115 lb (52.2 kg)   02/01/22 116 lb 3.2 oz (52.7 kg)   08/19/21 115 lb (52.2 kg)     BP Readings from Last 3 Encounters:   08/04/22 (!) 140/70   02/01/22 (!) 140/78   08/19/21 125/72     Visit Vitals  /75   Pulse 76   Temp 97.8 °F (36.6 °C) (Temporal)   Resp 16   Ht 5' 1\" (1.549 m)   Wt 108 lb (49 kg)   SpO2 96%   BMI 20.41 kg/m²     General appearance: alert, cooperative, no distress, appears stated age  Head: Normocephalic, without obvious abnormality, atraumatic  Neck: supple, symmetrical, trachea midline, no adenopathy, thyroid: not enlarged, symmetric, no tenderness/mass/nodules, no carotid bruit, and no JVD  Lungs: clear to auscultation bilaterally  Heart: regular rate and rhythm  Abdomen: soft, non-tender. Bowel sounds normal. No masses,  no organomegaly  Extremities: varicose veins noted, no edema              Disclaimer:  Return if symptoms worsen or fail to improve. Advised patient to call back or return to office if symptoms worsen/change/persist.     Discussed expected course/resolution/complications of diagnosis in detail with patient. Medication risks/benefits/costs/interactions/alternatives discussed with patient. Patient was given an after visit summary which includes diagnoses, current medications, & vitals. Patient expressed understanding with the diagnosis and plan.

## 2023-03-14 ENCOUNTER — OFFICE VISIT (OUTPATIENT)
Dept: INTERNAL MEDICINE CLINIC | Age: 88
End: 2023-03-14
Payer: MEDICARE

## 2023-03-14 VITALS
TEMPERATURE: 97.8 F | HEIGHT: 61 IN | WEIGHT: 108 LBS | BODY MASS INDEX: 20.39 KG/M2 | RESPIRATION RATE: 16 BRPM | SYSTOLIC BLOOD PRESSURE: 136 MMHG | HEART RATE: 76 BPM | OXYGEN SATURATION: 96 % | DIASTOLIC BLOOD PRESSURE: 75 MMHG

## 2023-03-14 DIAGNOSIS — E03.9 ACQUIRED HYPOTHYROIDISM: Primary | ICD-10-CM

## 2023-03-14 DIAGNOSIS — I10 BENIGN ESSENTIAL HYPERTENSION: ICD-10-CM

## 2023-03-14 DIAGNOSIS — N18.30 STAGE 3 CHRONIC KIDNEY DISEASE, UNSPECIFIED WHETHER STAGE 3A OR 3B CKD (HCC): ICD-10-CM

## 2023-03-14 DIAGNOSIS — Z79.899 ENCOUNTER FOR LONG-TERM CURRENT USE OF MEDICATION: ICD-10-CM

## 2023-03-14 DIAGNOSIS — M19.90 ARTHRITIS: ICD-10-CM

## 2023-03-14 PROCEDURE — G8420 CALC BMI NORM PARAMETERS: HCPCS | Performed by: INTERNAL MEDICINE

## 2023-03-14 PROCEDURE — G8510 SCR DEP NEG, NO PLAN REQD: HCPCS | Performed by: INTERNAL MEDICINE

## 2023-03-14 PROCEDURE — 1090F PRES/ABSN URINE INCON ASSESS: CPT | Performed by: INTERNAL MEDICINE

## 2023-03-14 PROCEDURE — 1101F PT FALLS ASSESS-DOCD LE1/YR: CPT | Performed by: INTERNAL MEDICINE

## 2023-03-14 PROCEDURE — G0463 HOSPITAL OUTPT CLINIC VISIT: HCPCS | Performed by: INTERNAL MEDICINE

## 2023-03-14 PROCEDURE — G8536 NO DOC ELDER MAL SCRN: HCPCS | Performed by: INTERNAL MEDICINE

## 2023-03-14 PROCEDURE — G8427 DOCREV CUR MEDS BY ELIG CLIN: HCPCS | Performed by: INTERNAL MEDICINE

## 2023-03-14 PROCEDURE — 99214 OFFICE O/P EST MOD 30 MIN: CPT | Performed by: INTERNAL MEDICINE

## 2023-03-14 NOTE — PROGRESS NOTES
Verified name and birth date for privacy precautions. Chart reviewed in preparation for today's visit. Chief Complaint   Patient presents with    Medication Evaluation          Health Maintenance Due   Topic    Shingles Vaccine (1 of 2)    COVID-19 Vaccine (4 - Booster for Moderna series)    Flu Vaccine (1)         Wt Readings from Last 3 Encounters:   03/14/23 108 lb (49 kg)   08/04/22 115 lb (52.2 kg)   02/01/22 116 lb 3.2 oz (52.7 kg)     Temp Readings from Last 3 Encounters:   03/14/23 97.8 °F (36.6 °C) (Temporal)   08/04/22 98.4 °F (36.9 °C) (Temporal)   02/01/22 97 °F (36.1 °C) (Temporal)     BP Readings from Last 3 Encounters:   03/14/23 136/75   08/04/22 (!) 140/70   02/01/22 (!) 140/78     Pulse Readings from Last 3 Encounters:   03/14/23 76   08/04/22 84   02/01/22 75         Learning Assessment:  :     Learning Assessment 2/11/2019 7/6/2015 4/24/2014   PRIMARY LEARNER Patient Patient Patient   HIGHEST LEVEL OF EDUCATION - PRIMARY LEARNER  GRADUATED HIGH SCHOOL OR GED GRADUATED HIGH SCHOOL OR GED GRADUATED HIGH SCHOOL OR GED   BARRIERS PRIMARY LEARNER NONE NONE NONE   CO-LEARNER CAREGIVER No No No   PRIMARY LANGUAGE ENGLISH ENGLISH ENGLISH    NEED - No No   LEARNER PREFERENCE PRIMARY READING DEMONSTRATION DEMONSTRATION     LISTENING - -   LEARNING SPECIAL TOPICS - no no   ANSWERED BY patient patient patient   RELATIONSHIP SELF SELF SELF       Depression Screening:  :     3 most recent PHQ Screens 3/14/2023   Little interest or pleasure in doing things Not at all   Feeling down, depressed, irritable, or hopeless Not at all   Total Score PHQ 2 0       Fall Risk Assessment:  :     Fall Risk Assessment, last 12 mths 3/14/2023   Able to walk? Yes   Fall in past 12 months? 1   Do you feel unsteady? 0   Are you worried about falling 0   Number of falls in past 12 months 1   Fall with injury?  0       Abuse Screening:  :     Abuse Screening Questionnaire 3/14/2023 8/4/2022 8/19/2021 8/3/2020 2/11/2019 7/6/2015 4/24/2014   Do you ever feel afraid of your partner? N N N N N N N   Are you in a relationship with someone who physically or mentally threatens you? N N N N N N N   Is it safe for you to go home?  Minerva Brumfield

## 2023-03-15 LAB
ALBUMIN SERPL-MCNC: 3.8 G/DL (ref 3.5–5)
ALBUMIN/GLOB SERPL: 1.3 (ref 1.1–2.2)
ALP SERPL-CCNC: 64 U/L (ref 45–117)
ALT SERPL-CCNC: 17 U/L (ref 12–78)
ANION GAP SERPL CALC-SCNC: 4 MMOL/L (ref 5–15)
AST SERPL-CCNC: 18 U/L (ref 15–37)
BASOPHILS # BLD: 0 K/UL (ref 0–0.1)
BASOPHILS NFR BLD: 0 % (ref 0–1)
BILIRUB SERPL-MCNC: 0.7 MG/DL (ref 0.2–1)
BUN SERPL-MCNC: 28 MG/DL (ref 6–20)
BUN/CREAT SERPL: 35 (ref 12–20)
CALCIUM SERPL-MCNC: 9.3 MG/DL (ref 8.5–10.1)
CHLORIDE SERPL-SCNC: 106 MMOL/L (ref 97–108)
CO2 SERPL-SCNC: 31 MMOL/L (ref 21–32)
CREAT SERPL-MCNC: 0.79 MG/DL (ref 0.55–1.02)
DIFFERENTIAL METHOD BLD: ABNORMAL
EOSINOPHIL # BLD: 0.1 K/UL (ref 0–0.4)
EOSINOPHIL NFR BLD: 2 % (ref 0–7)
ERYTHROCYTE [DISTWIDTH] IN BLOOD BY AUTOMATED COUNT: 14.5 % (ref 11.5–14.5)
GLOBULIN SER CALC-MCNC: 3 G/DL (ref 2–4)
GLUCOSE SERPL-MCNC: 97 MG/DL (ref 65–100)
HCT VFR BLD AUTO: 34.3 % (ref 35–47)
HGB BLD-MCNC: 10.9 G/DL (ref 11.5–16)
IMM GRANULOCYTES # BLD AUTO: 0 K/UL (ref 0–0.04)
IMM GRANULOCYTES NFR BLD AUTO: 0 % (ref 0–0.5)
LYMPHOCYTES # BLD: 1.9 K/UL (ref 0.8–3.5)
LYMPHOCYTES NFR BLD: 35 % (ref 12–49)
MCH RBC QN AUTO: 29.5 PG (ref 26–34)
MCHC RBC AUTO-ENTMCNC: 31.8 G/DL (ref 30–36.5)
MCV RBC AUTO: 93 FL (ref 80–99)
MONOCYTES # BLD: 0.8 K/UL (ref 0–1)
MONOCYTES NFR BLD: 15 % (ref 5–13)
NEUTS SEG # BLD: 2.6 K/UL (ref 1.8–8)
NEUTS SEG NFR BLD: 48 % (ref 32–75)
NRBC # BLD: 0 K/UL (ref 0–0.01)
NRBC BLD-RTO: 0 PER 100 WBC
PLATELET # BLD AUTO: 183 K/UL (ref 150–400)
PMV BLD AUTO: 10.7 FL (ref 8.9–12.9)
POTASSIUM SERPL-SCNC: 4.5 MMOL/L (ref 3.5–5.1)
PROT SERPL-MCNC: 6.8 G/DL (ref 6.4–8.2)
RBC # BLD AUTO: 3.69 M/UL (ref 3.8–5.2)
SODIUM SERPL-SCNC: 141 MMOL/L (ref 136–145)
T4 FREE SERPL-MCNC: 1.3 NG/DL (ref 0.8–1.5)
TSH SERPL DL<=0.05 MIU/L-ACNC: 0.74 UIU/ML (ref 0.36–3.74)
WBC # BLD AUTO: 5.5 K/UL (ref 3.6–11)

## 2023-07-11 DIAGNOSIS — I10 ESSENTIAL (PRIMARY) HYPERTENSION: ICD-10-CM

## 2023-07-11 DIAGNOSIS — E03.9 HYPOTHYROIDISM, UNSPECIFIED: ICD-10-CM

## 2023-07-11 RX ORDER — METOPROLOL SUCCINATE 25 MG/1
TABLET, EXTENDED RELEASE ORAL
Qty: 90 TABLET | Refills: 0 | Status: SHIPPED | OUTPATIENT
Start: 2023-07-11

## 2023-07-11 RX ORDER — LOSARTAN POTASSIUM 50 MG/1
TABLET ORAL
Qty: 90 TABLET | Refills: 0 | Status: SHIPPED | OUTPATIENT
Start: 2023-07-11

## 2023-07-11 RX ORDER — LEVOTHYROXINE SODIUM 0.07 MG/1
TABLET ORAL
Qty: 90 TABLET | Refills: 0 | Status: SHIPPED | OUTPATIENT
Start: 2023-07-11

## 2023-07-28 ENCOUNTER — APPOINTMENT (OUTPATIENT)
Facility: HOSPITAL | Age: 88
End: 2023-07-28
Payer: MEDICARE

## 2023-07-28 ENCOUNTER — HOSPITAL ENCOUNTER (EMERGENCY)
Facility: HOSPITAL | Age: 88
Discharge: HOME | End: 2023-07-28
Attending: STUDENT IN AN ORGANIZED HEALTH CARE EDUCATION/TRAINING PROGRAM
Payer: MEDICARE

## 2023-07-28 VITALS
RESPIRATION RATE: 18 BRPM | SYSTOLIC BLOOD PRESSURE: 137 MMHG | TEMPERATURE: 97.3 F | DIASTOLIC BLOOD PRESSURE: 75 MMHG | HEART RATE: 71 BPM | OXYGEN SATURATION: 97 %

## 2023-07-28 DIAGNOSIS — M25.552 LEFT HIP PAIN: ICD-10-CM

## 2023-07-28 DIAGNOSIS — H11.31 SUBCONJUNCTIVAL HEMORRHAGE OF RIGHT EYE: ICD-10-CM
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
## 2023-07-28 PROCEDURE — 99284 EMERGENCY DEPT VISIT MOD MDM: CPT

## 2023-07-28 PROCEDURE — 73502 X-RAY EXAM HIP UNI 2-3 VIEWS: CPT

## 2023-07-28 PROCEDURE — 72125 CT NECK SPINE W/O DYE: CPT

## 2023-07-28 PROCEDURE — 70450 CT HEAD/BRAIN W/O DYE: CPT

## 2023-07-28 ASSESSMENT — PAIN DESCRIPTION - ORIENTATION: ORIENTATION: LEFT

## 2023-07-28 ASSESSMENT — PAIN DESCRIPTION - LOCATION: LOCATION: HIP

## 2023-07-28 ASSESSMENT — PAIN SCALES - GENERAL: PAINLEVEL_OUTOF10: 5

## 2023-07-28 ASSESSMENT — PAIN DESCRIPTION - DESCRIPTORS: DESCRIPTORS: ACHING

## 2023-07-28 ASSESSMENT — PAIN - FUNCTIONAL ASSESSMENT: PAIN_FUNCTIONAL_ASSESSMENT: 0-10

## 2023-07-28 NOTE — ED NOTES
3:05 PM  I have evaluated the patient as the Provider in Rapid Medical Evaluation (RME). I have reviewed her vital signs and the triage nurse assessment. I have talked with the patient and any available family and advised that I am the provider in triage and have ordered the appropriate study to initiate their work up based on the clinical presentation during my assessment. I have advised that the patient will be accommodated in the Main ED as soon as possible. I have also requested to contact the triage nurse or myself immediately if the patient experiences any changes in their condition during this brief waiting period. Patient is a 80 y.o. female with history of hypertension, hypothyroidism, and macular degeneration who presents to the ER with reports of tripping over her shoes while at [employer] and hitting her head. She is unsure what she hit when she fell. She fell on the left side, but is able to walk in the ER. Patient is not on a blood thinner.      [facility], [place]-       [address] [place], [place]  07/28/23 2874

## 2023-07-28 NOTE — ED TRIAGE NOTES
Pt reports she fell today while at [employer] and hit the back of her head and injured the L side of her body. Pt reports L hip pain and L arm pain. Pt denies LOC and blood thinners.

## 2023-07-28 NOTE — ED NOTES
Bedside and Verbal shift change report given to Hawa Campbell (oncoming nurse) by Gudelia Souza (offgoing nurse). Report included the following information Nurse Handoff Report, Index, ED Encounter Summary, ED SBAR, MAR, and Recent Results.         Neil Montiel RN  07/28/23 1945

## 2023-07-29 NOTE — ED PROVIDER NOTES
[facility] EMERGENCY DEP  EMERGENCY DEPARTMENT ENCOUNTER      Pt Name: Charan Hart  MRN: [id number]  [address] [facility] [place] 12/18/1925  Date of evaluation: 7/28/2023  Provider: Elisa Camp       Chief Complaint   Patient presents with    Fall         HISTORY OF PRESENT ILLNESS   (Location/Symptom, Timing/Onset, Context/Setting, Quality, Duration, Modifying Factors, Severity)  Note limiting factors. HPI      Review of External Medical Records:     Nursing Notes were reviewed. REVIEW OF SYSTEMS    (2-9 systems for level 4, 10 or more for level 5)     Review of Systems    Except as noted above the remainder of the review of systems was reviewed and negative. PAST MEDICAL HISTORY     Past Medical History:   Diagnosis Date    Hypercholesterolemia     Hypertension     Thyroid disease     hypothyroid         SURGICAL HISTORY       Past Surgical History:   Procedure Laterality Date    BACK SURGERY  1984    BREAST BIOPSY  2010    duct removal, benign    COLONOSCOPY  2001    DILATION AND CURETTAGE OF UTERUS      HX OPEN REDUCTION INTERNAL FIXATION  1996    L forearm    HYSTERECTOMY (CERVIX STATUS UNKNOWN)  1980    +BSO    VASCULAR SURGERY  1960s    vein stripping-bilat         CURRENT MEDICATIONS       Discharge Medication List as of 7/28/2023  7:45 PM        CONTINUE these medications which have NOT CHANGED    Details   metoprolol succinate (TOPROL XL) 25 MG extended release tablet TAKE 1 TABLET BY MOUTH EVERY DAY, Disp-90 tablet, R-0This prescription was filled on 04/27/2023. Any refills authorized will be placed on file. Normal      levothyroxine (SYNTHROID) 75 MCG tablet TAKE 1 TABLET BY MOUTH EVERY MORNING BEFORE BREAKFAST, Disp-90 tablet, R-0This prescription was filled on 04/27/2023. Any refills authorized will be placed on file. Normal      losartan (COZAAR) 50 MG tablet TAKE 1 TABLET BY MOUTH EVERY DAY, Disp-90 tablet, R-0This prescription was filled on 04/27/2023.  Any refills authorized
Adult

## 2023-07-31 ASSESSMENT — ENCOUNTER SYMPTOMS
SHORTNESS OF BREATH: 0
ABDOMINAL PAIN: 0

## 2023-08-07 ENCOUNTER — TELEPHONE (OUTPATIENT)
Age: 88
End: 2023-08-07

## 2023-08-07 NOTE — TELEPHONE ENCOUNTER
Patient had a fall on 7/28 and was seen at [facility].   She would like to know if she should schedule a follow-up appointment with Dr. Ant Downing - [phone]

## 2023-09-12 ENCOUNTER — OFFICE VISIT (OUTPATIENT)
Age: 88
End: 2023-09-12
Payer: MEDICARE

## 2023-09-12 VITALS
DIASTOLIC BLOOD PRESSURE: 65 MMHG | RESPIRATION RATE: 16 BRPM | HEIGHT: 61 IN | SYSTOLIC BLOOD PRESSURE: 130 MMHG | OXYGEN SATURATION: 95 % | BODY MASS INDEX: 19.86 KG/M2 | TEMPERATURE: 98.2 F | HEART RATE: 81 BPM | WEIGHT: 105.2 LBS

## 2023-09-12 DIAGNOSIS — I10 ESSENTIAL (PRIMARY) HYPERTENSION: ICD-10-CM

## 2023-09-12 DIAGNOSIS — I10 ESSENTIAL (PRIMARY) HYPERTENSION: Primary | ICD-10-CM

## 2023-09-12 DIAGNOSIS — R14.3 FLATULENCE: ICD-10-CM

## 2023-09-12 DIAGNOSIS — Z79.899 ENCOUNTER FOR LONG-TERM (CURRENT) USE OF MEDICATIONS: ICD-10-CM

## 2023-09-12 DIAGNOSIS — E03.9 HYPOTHYROIDISM, UNSPECIFIED TYPE: ICD-10-CM

## 2023-09-12 LAB
ALBUMIN SERPL-MCNC: 3.9 G/DL (ref 3.5–5)
ALBUMIN/GLOB SERPL: 1.3 (ref 1.1–2.2)
ALP SERPL-CCNC: 68 U/L (ref 45–117)
ALT SERPL-CCNC: 17 U/L (ref 12–78)
ANION GAP SERPL CALC-SCNC: 4 MMOL/L (ref 5–15)
AST SERPL-CCNC: 18 U/L (ref 15–37)
BASOPHILS # BLD: 0.1 K/UL (ref 0–0.1)
BASOPHILS NFR BLD: 1 % (ref 0–1)
BILIRUB SERPL-MCNC: 1 MG/DL (ref 0.2–1)
BUN SERPL-MCNC: 28 MG/DL (ref 6–20)
BUN/CREAT SERPL: 28 (ref 12–20)
CALCIUM SERPL-MCNC: 9.3 MG/DL (ref 8.5–10.1)
CHLORIDE SERPL-SCNC: 105 MMOL/L (ref 97–108)
CO2 SERPL-SCNC: 31 MMOL/L (ref 21–32)
CREAT SERPL-MCNC: 1.01 MG/DL (ref 0.55–1.02)
DIFFERENTIAL METHOD BLD: ABNORMAL
EOSINOPHIL # BLD: 0.1 K/UL (ref 0–0.4)
EOSINOPHIL NFR BLD: 1 % (ref 0–7)
ERYTHROCYTE [DISTWIDTH] IN BLOOD BY AUTOMATED COUNT: 14.4 % (ref 11.5–14.5)
GLOBULIN SER CALC-MCNC: 3.1 G/DL (ref 2–4)
GLUCOSE SERPL-MCNC: 90 MG/DL (ref 65–100)
HCT VFR BLD AUTO: 34.5 % (ref 35–47)
HGB BLD-MCNC: 10.9 G/DL (ref 11.5–16)
IMM GRANULOCYTES # BLD AUTO: 0 K/UL (ref 0–0.04)
IMM GRANULOCYTES NFR BLD AUTO: 0 % (ref 0–0.5)
LYMPHOCYTES # BLD: 1.9 K/UL (ref 0.8–3.5)
LYMPHOCYTES NFR BLD: 30 % (ref 12–49)
MCH RBC QN AUTO: 29.9 PG (ref 26–34)
MCHC RBC AUTO-ENTMCNC: 31.6 G/DL (ref 30–36.5)
MCV RBC AUTO: 94.8 FL (ref 80–99)
MONOCYTES # BLD: 0.8 K/UL (ref 0–1)
MONOCYTES NFR BLD: 13 % (ref 5–13)
NEUTS SEG # BLD: 3.5 K/UL (ref 1.8–8)
NEUTS SEG NFR BLD: 55 % (ref 32–75)
NRBC # BLD: 0 K/UL (ref 0–0.01)
NRBC BLD-RTO: 0 PER 100 WBC
PLATELET # BLD AUTO: 170 K/UL (ref 150–400)
PMV BLD AUTO: 10.6 FL (ref 8.9–12.9)
POTASSIUM SERPL-SCNC: 4.7 MMOL/L (ref 3.5–5.1)
PROT SERPL-MCNC: 7 G/DL (ref 6.4–8.2)
RBC # BLD AUTO: 3.64 M/UL (ref 3.8–5.2)
RBC MORPH BLD: ABNORMAL
SODIUM SERPL-SCNC: 140 MMOL/L (ref 136–145)
T4 FREE SERPL-MCNC: 1.3 NG/DL (ref 0.8–1.5)
TSH SERPL DL<=0.05 MIU/L-ACNC: 1.26 UIU/ML (ref 0.36–3.74)
WBC # BLD AUTO: 6.4 K/UL (ref 3.6–11)

## 2023-09-12 PROCEDURE — G8420 CALC BMI NORM PARAMETERS: HCPCS | Performed by: INTERNAL MEDICINE

## 2023-09-12 PROCEDURE — 99214 OFFICE O/P EST MOD 30 MIN: CPT | Performed by: INTERNAL MEDICINE

## 2023-09-12 PROCEDURE — 1090F PRES/ABSN URINE INCON ASSESS: CPT | Performed by: INTERNAL MEDICINE

## 2023-09-12 PROCEDURE — G8427 DOCREV CUR MEDS BY ELIG CLIN: HCPCS | Performed by: INTERNAL MEDICINE

## 2023-09-12 PROCEDURE — 1123F ACP DISCUSS/DSCN MKR DOCD: CPT | Performed by: INTERNAL MEDICINE

## 2023-09-12 PROCEDURE — 1036F TOBACCO NON-USER: CPT | Performed by: INTERNAL MEDICINE

## 2023-09-12 SDOH — ECONOMIC STABILITY: FOOD INSECURITY: WITHIN THE PAST 12 MONTHS, YOU WORRIED THAT YOUR FOOD WOULD RUN OUT BEFORE YOU GOT MONEY TO BUY MORE.: NEVER TRUE

## 2023-09-12 SDOH — ECONOMIC STABILITY: HOUSING INSECURITY
IN THE LAST 12 MONTHS, WAS THERE A TIME WHEN YOU DID NOT HAVE A STEADY PLACE TO SLEEP OR SLEPT IN A SHELTER (INCLUDING NOW)?: NO

## 2023-09-12 SDOH — ECONOMIC STABILITY: INCOME INSECURITY: HOW HARD IS IT FOR YOU TO PAY FOR THE VERY BASICS LIKE FOOD, HOUSING, MEDICAL CARE, AND HEATING?: NOT HARD AT ALL

## 2023-09-12 SDOH — ECONOMIC STABILITY: FOOD INSECURITY: WITHIN THE PAST 12 MONTHS, THE FOOD YOU BOUGHT JUST DIDN'T LAST AND YOU DIDN'T HAVE MONEY TO GET MORE.: NEVER TRUE

## 2023-09-12 ASSESSMENT — PATIENT HEALTH QUESTIONNAIRE - PHQ9
1. LITTLE INTEREST OR PLEASURE IN DOING THINGS: 0
SUM OF ALL RESPONSES TO PHQ QUESTIONS 1-9: 0
SUM OF ALL RESPONSES TO PHQ QUESTIONS 1-9: 0
SUM OF ALL RESPONSES TO PHQ9 QUESTIONS 1 & 2: 0
SUM OF ALL RESPONSES TO PHQ QUESTIONS 1-9: 0
2. FEELING DOWN, DEPRESSED OR HOPELESS: 0
SUM OF ALL RESPONSES TO PHQ QUESTIONS 1-9: 0

## 2023-09-12 NOTE — PROGRESS NOTES
Assessment/Plan:     1. Essential (primary) hypertension  -controlled with use of losartan 50mg daily. -no adjustments needed at this time. - CBC with Auto Differential; Future  - Comprehensive Metabolic Panel; Future  - TSH; Future    2. Hypothyroidism, unspecified type  -appears clinically euthyroid.  -taking levothyroxine  75mcg daily.     - CBC with Auto Differential; Future  - Comprehensive Metabolic Panel; Future  - TSH; Future  - T4, Free; Future    3. Encounter for long-term (current) use of medications      4. Flatulence  -she has started use of a probiotic  -recommended she also pay attention to which foods trigger her symptoms and to reduce consumption of those foods. Orders Placed This Encounter    CBC with Auto Differential     Standing Status:   Future     Number of Occurrences:   1     Standing Expiration Date:   9/12/2024    Comprehensive Metabolic Panel     Standing Status:   Future     Number of Occurrences:   1     Standing Expiration Date:   9/12/2024    TSH     Standing Status:   Future     Number of Occurrences:   1     Standing Expiration Date:   9/12/2024    T4, Free     Standing Status:   Future     Number of Occurrences:   1     Standing Expiration Date:   9/12/2024    Multiple Vitamins-Minerals (PRESERVISION AREDS PO)     Sig: Take by mouth daily             Follow-up Disposition:     Follow up in 6 months               Subjective:      Brisa Thomas is a 80 y.o. female who presents today for follow up of her hypothyroid and hypertension     Since last visit :  -taking an omega supplement 2 tablet daily. -has gas and increased flatulence. Would like to start use of a probiotic.   -in independent living. Does exercise classes twice per week- chair exercise. living on [address]   -still driving.   -daughters live in [address], they check on her regularly. Son lives out of state. Objective:      Wt Readings from Last 3 Encounters:   09/12/23 105 lb

## 2023-09-26 ENCOUNTER — TELEPHONE (OUTPATIENT)
Age: 88
End: 2023-09-26

## 2023-09-26 NOTE — TELEPHONE ENCOUNTER
Jase Burroughs MD  [facility] End Im Team Three 44 minutes ago (10:31 AM)       Ok to drop off for me to complete.      Jase Burroughs MD

## 2023-09-26 NOTE — TELEPHONE ENCOUNTER
----- Message from Stephanie Willis sent at 9/25/2023  4:15 PM EDT -----  Subject: Message to Provider    QUESTIONS  Information for Provider? Patient is calling would like to know if she has   to make an appointment to see provider again patient was seen on 9/12/23 @   12:40pm to have papers for [facility] for bus to pick her up or can she drop off   paperwork to office. Please Advise  ---------------------------------------------------------------------------  --------------  Tata Marker INFO  [phone]; OK to leave message on voicemail  ---------------------------------------------------------------------------  --------------  SCRIPT ANSWERS  Relationship to Patient?  Self

## 2023-09-26 NOTE — TELEPHONE ENCOUNTER
Left voicemail message advising patient to drop the form at the  and someone will contact her once its complete.

## 2023-10-18 DIAGNOSIS — I10 ESSENTIAL (PRIMARY) HYPERTENSION: ICD-10-CM

## 2023-10-18 DIAGNOSIS — E03.9 HYPOTHYROIDISM, UNSPECIFIED: ICD-10-CM

## 2023-10-19 RX ORDER — METOPROLOL SUCCINATE 25 MG/1
TABLET, EXTENDED RELEASE ORAL
Qty: 90 TABLET | Refills: 0 | Status: SHIPPED | OUTPATIENT
Start: 2023-10-19

## 2023-10-19 RX ORDER — LEVOTHYROXINE SODIUM 0.07 MG/1
TABLET ORAL
Qty: 90 TABLET | Refills: 0 | Status: SHIPPED | OUTPATIENT
Start: 2023-10-19

## 2023-10-19 RX ORDER — LOSARTAN POTASSIUM 50 MG/1
TABLET ORAL
Qty: 90 TABLET | Refills: 0 | Status: SHIPPED | OUTPATIENT
Start: 2023-10-19

## 2024-01-22 DIAGNOSIS — I10 ESSENTIAL (PRIMARY) HYPERTENSION: ICD-10-CM

## 2024-01-22 DIAGNOSIS — E03.9 HYPOTHYROIDISM, UNSPECIFIED: ICD-10-CM

## 2024-01-23 RX ORDER — LEVOTHYROXINE SODIUM 0.07 MG/1
TABLET ORAL
Qty: 90 TABLET | Refills: 1 | Status: SHIPPED | OUTPATIENT
Start: 2024-01-23

## 2024-01-23 RX ORDER — METOPROLOL SUCCINATE 25 MG/1
TABLET, EXTENDED RELEASE ORAL
Qty: 90 TABLET | Refills: 1 | Status: SHIPPED | OUTPATIENT
Start: 2024-01-23

## 2024-01-23 RX ORDER — LOSARTAN POTASSIUM 50 MG/1
TABLET ORAL
Qty: 90 TABLET | Refills: 1 | Status: SHIPPED | OUTPATIENT
Start: 2024-01-23

## 2024-03-13 ENCOUNTER — HOSPITAL ENCOUNTER (EMERGENCY)
Facility: HOSPITAL | Age: 89
Discharge: HOME | End: 2024-03-13
Attending: STUDENT IN AN ORGANIZED HEALTH CARE EDUCATION/TRAINING PROGRAM
Payer: MEDICARE

## 2024-03-13 ENCOUNTER — APPOINTMENT (OUTPATIENT)
Facility: HOSPITAL | Age: 89
End: 2024-03-13
Payer: MEDICARE

## 2024-03-13 VITALS
WEIGHT: 103.17 LBS | RESPIRATION RATE: 16 BRPM | SYSTOLIC BLOOD PRESSURE: 150 MMHG | HEART RATE: 86 BPM | DIASTOLIC BLOOD PRESSURE: 67 MMHG | HEIGHT: 60 IN | BODY MASS INDEX: 20.26 KG/M2 | OXYGEN SATURATION: 97 % | TEMPERATURE: 98.2 F

## 2024-03-13 DIAGNOSIS — S09.90XA INJURY OF HEAD, INITIAL ENCOUNTER: Primary | ICD-10-CM

## 2024-03-13 DIAGNOSIS — S60.00XA TRAUMATIC ECCHYMOSIS OF FINGER, INITIAL ENCOUNTER: ICD-10-CM

## 2024-03-13 DIAGNOSIS — S61.419A SKIN TEAR OF HAND WITHOUT COMPLICATION, INITIAL ENCOUNTER: ICD-10-CM

## 2024-03-13 LAB
ANION GAP SERPL CALC-SCNC: 6 MMOL/L (ref 5–15)
BASOPHILS # BLD: 0 K/UL (ref 0–0.1)
BASOPHILS NFR BLD: 0 % (ref 0–1)
BUN SERPL-MCNC: 27 MG/DL (ref 6–20)
BUN/CREAT SERPL: 30 (ref 12–20)
CALCIUM SERPL-MCNC: 9.1 MG/DL (ref 8.5–10.1)
CHLORIDE SERPL-SCNC: 108 MMOL/L (ref 97–108)
CO2 SERPL-SCNC: 27 MMOL/L (ref 21–32)
COMMENT:: NORMAL
CREAT SERPL-MCNC: 0.91 MG/DL (ref 0.55–1.02)
DIFFERENTIAL METHOD BLD: ABNORMAL
EKG ATRIAL RATE: 82 BPM
EKG DIAGNOSIS: NORMAL
EKG P AXIS: 87 DEGREES
EKG P-R INTERVAL: 212 MS
EKG Q-T INTERVAL: 380 MS
EKG QRS DURATION: 66 MS
EKG QTC CALCULATION (BAZETT): 443 MS
EKG R AXIS: 24 DEGREES
EKG T AXIS: 1 DEGREES
EKG VENTRICULAR RATE: 82 BPM
EOSINOPHIL # BLD: 0.1 K/UL (ref 0–0.4)
EOSINOPHIL NFR BLD: 2 % (ref 0–7)
ERYTHROCYTE [DISTWIDTH] IN BLOOD BY AUTOMATED COUNT: 14.7 % (ref 11.5–14.5)
GLUCOSE SERPL-MCNC: 122 MG/DL (ref 65–100)
HCT VFR BLD AUTO: 32.5 % (ref 35–47)
HGB BLD-MCNC: 10.3 G/DL (ref 11.5–16)
IMM GRANULOCYTES # BLD AUTO: 0 K/UL (ref 0–0.04)
IMM GRANULOCYTES NFR BLD AUTO: 0 % (ref 0–0.5)
LYMPHOCYTES # BLD: 1.7 K/UL (ref 0.8–3.5)
LYMPHOCYTES NFR BLD: 23 % (ref 12–49)
MCH RBC QN AUTO: 29.5 PG (ref 26–34)
MCHC RBC AUTO-ENTMCNC: 31.7 G/DL (ref 30–36.5)
MCV RBC AUTO: 93.1 FL (ref 80–99)
MONOCYTES # BLD: 1 K/UL (ref 0–1)
MONOCYTES NFR BLD: 13 % (ref 5–13)
NEUTS SEG # BLD: 4.5 K/UL (ref 1.8–8)
NEUTS SEG NFR BLD: 62 % (ref 32–75)
NRBC # BLD: 0 K/UL (ref 0–0.01)
NRBC BLD-RTO: 0 PER 100 WBC
PLATELET # BLD AUTO: 165 K/UL (ref 150–400)
PMV BLD AUTO: 10.6 FL (ref 8.9–12.9)
POTASSIUM SERPL-SCNC: 3.7 MMOL/L (ref 3.5–5.1)
RBC # BLD AUTO: 3.49 M/UL (ref 3.8–5.2)
SODIUM SERPL-SCNC: 141 MMOL/L (ref 136–145)
SPECIMEN HOLD: NORMAL
TROPONIN I SERPL HS-MCNC: 15 NG/L (ref 0–51)
WBC # BLD AUTO: 7.3 K/UL (ref 3.6–11)

## 2024-03-13 PROCEDURE — 85025 COMPLETE CBC W/AUTO DIFF WBC: CPT

## 2024-03-13 PROCEDURE — 99285 EMERGENCY DEPT VISIT HI MDM: CPT

## 2024-03-13 PROCEDURE — 36415 COLL VENOUS BLD VENIPUNCTURE: CPT

## 2024-03-13 PROCEDURE — 73130 X-RAY EXAM OF HAND: CPT

## 2024-03-13 PROCEDURE — 70450 CT HEAD/BRAIN W/O DYE: CPT

## 2024-03-13 PROCEDURE — 72125 CT NECK SPINE W/O DYE: CPT

## 2024-03-13 PROCEDURE — 80048 BASIC METABOLIC PNL TOTAL CA: CPT

## 2024-03-13 PROCEDURE — 90471 IMMUNIZATION ADMIN: CPT | Performed by: STUDENT IN AN ORGANIZED HEALTH CARE EDUCATION/TRAINING PROGRAM

## 2024-03-13 PROCEDURE — 84484 ASSAY OF TROPONIN QUANT: CPT

## 2024-03-13 PROCEDURE — 6360000002 HC RX W HCPCS: Performed by: STUDENT IN AN ORGANIZED HEALTH CARE EDUCATION/TRAINING PROGRAM

## 2024-03-13 PROCEDURE — 93010 ELECTROCARDIOGRAM REPORT: CPT | Performed by: INTERNAL MEDICINE

## 2024-03-13 PROCEDURE — 93005 ELECTROCARDIOGRAM TRACING: CPT | Performed by: STUDENT IN AN ORGANIZED HEALTH CARE EDUCATION/TRAINING PROGRAM

## 2024-03-13 PROCEDURE — 90714 TD VACC NO PRESV 7 YRS+ IM: CPT | Performed by: STUDENT IN AN ORGANIZED HEALTH CARE EDUCATION/TRAINING PROGRAM

## 2024-03-13 RX ORDER — TETANUS AND DIPHTHERIA TOXOIDS ADSORBED 2; 2 [LF]/.5ML; [LF]/.5ML
0.5 INJECTION INTRAMUSCULAR ONCE
Status: COMPLETED | OUTPATIENT
Start: 2024-03-13 | End: 2024-03-13

## 2024-03-13 RX ADMIN — TETANUS AND DIPHTHERIA TOXOIDS ADSORBED 0.5 ML: 2; 2 INJECTION INTRAMUSCULAR at 09:22

## 2024-03-13 ASSESSMENT — PAIN DESCRIPTION - ORIENTATION: ORIENTATION: RIGHT

## 2024-03-13 ASSESSMENT — PAIN - FUNCTIONAL ASSESSMENT: PAIN_FUNCTIONAL_ASSESSMENT: PREVENTS OR INTERFERES SOME ACTIVE ACTIVITIES AND ADLS

## 2024-03-13 ASSESSMENT — PAIN DESCRIPTION - DESCRIPTORS: DESCRIPTORS: ACHING

## 2024-03-13 ASSESSMENT — PAIN DESCRIPTION - PAIN TYPE: TYPE: ACUTE PAIN

## 2024-03-13 ASSESSMENT — PAIN DESCRIPTION - LOCATION: LOCATION: HAND

## 2024-03-13 ASSESSMENT — PAIN SCALES - GENERAL: PAINLEVEL_OUTOF10: 3

## 2024-03-13 NOTE — ED TRIAGE NOTES
Pt arrives ambulatory from home with cc fall. Pt was watching TV when she felt dizzy, fell on the floor and hit her head. Pt has a skin tear of R hand after hitting it against wooden furniture. Pt denies losing consciousness       Pt denies being on blood thinners

## 2024-03-13 NOTE — ED NOTES
Reviewed discharge instructions with patient. All questions answered. IV removed.     Patient-Reported Vitals  No data recorded

## 2024-03-19 ENCOUNTER — OFFICE VISIT (OUTPATIENT)
Age: 89
End: 2024-03-19
Payer: MEDICARE

## 2024-03-19 VITALS
RESPIRATION RATE: 16 BRPM | BODY MASS INDEX: 19.79 KG/M2 | WEIGHT: 100.8 LBS | DIASTOLIC BLOOD PRESSURE: 70 MMHG | HEART RATE: 73 BPM | TEMPERATURE: 97.8 F | SYSTOLIC BLOOD PRESSURE: 140 MMHG | OXYGEN SATURATION: 99 % | HEIGHT: 60 IN

## 2024-03-19 DIAGNOSIS — S61.411D SKIN TEAR OF RIGHT HAND WITHOUT COMPLICATION, SUBSEQUENT ENCOUNTER: ICD-10-CM

## 2024-03-19 DIAGNOSIS — R60.0 PEDAL EDEMA: ICD-10-CM

## 2024-03-19 DIAGNOSIS — Z79.899 ENCOUNTER FOR LONG-TERM (CURRENT) USE OF MEDICATIONS: ICD-10-CM

## 2024-03-19 DIAGNOSIS — N18.30 STAGE 3 CHRONIC KIDNEY DISEASE, UNSPECIFIED WHETHER STAGE 3A OR 3B CKD (HCC): ICD-10-CM

## 2024-03-19 DIAGNOSIS — I10 ESSENTIAL (PRIMARY) HYPERTENSION: Primary | ICD-10-CM

## 2024-03-19 DIAGNOSIS — E03.9 HYPOTHYROIDISM, UNSPECIFIED TYPE: ICD-10-CM

## 2024-03-19 PROCEDURE — 99214 OFFICE O/P EST MOD 30 MIN: CPT | Performed by: INTERNAL MEDICINE

## 2024-03-19 PROCEDURE — G8420 CALC BMI NORM PARAMETERS: HCPCS | Performed by: INTERNAL MEDICINE

## 2024-03-19 PROCEDURE — 1036F TOBACCO NON-USER: CPT | Performed by: INTERNAL MEDICINE

## 2024-03-19 PROCEDURE — G8484 FLU IMMUNIZE NO ADMIN: HCPCS | Performed by: INTERNAL MEDICINE

## 2024-03-19 PROCEDURE — 1090F PRES/ABSN URINE INCON ASSESS: CPT | Performed by: INTERNAL MEDICINE

## 2024-03-19 PROCEDURE — G8427 DOCREV CUR MEDS BY ELIG CLIN: HCPCS | Performed by: INTERNAL MEDICINE

## 2024-03-19 PROCEDURE — 1123F ACP DISCUSS/DSCN MKR DOCD: CPT | Performed by: INTERNAL MEDICINE

## 2024-03-19 ASSESSMENT — PATIENT HEALTH QUESTIONNAIRE - PHQ9
SUM OF ALL RESPONSES TO PHQ QUESTIONS 1-9: 0
SUM OF ALL RESPONSES TO PHQ QUESTIONS 1-9: 0
2. FEELING DOWN, DEPRESSED OR HOPELESS: NOT AT ALL
SUM OF ALL RESPONSES TO PHQ QUESTIONS 1-9: 0
SUM OF ALL RESPONSES TO PHQ QUESTIONS 1-9: 0
1. LITTLE INTEREST OR PLEASURE IN DOING THINGS: NOT AT ALL
SUM OF ALL RESPONSES TO PHQ9 QUESTIONS 1 & 2: 0

## 2024-03-19 NOTE — PROGRESS NOTES
Assessment/Plan:     1. Essential (primary) hypertension  -stable with use of losartan 50mg daily and toprol XL 25mg daily.  -no adjustments at this time.     2. Stage 3 chronic kidney disease, unspecified whether stage 3a or 3b CKD (HCC)  -renal function done on 3/12    3. Hypothyroidism, unspecified type  -taking levothyroxine 75mcg daily only 6 days per week.  -need thyroid function today.      - TSH; Future  - T4, Free; Future  - T4, Free  - TSH    4. Pedal edema  -recommended she wear compression stockings daily  -elevate legs when sitting.     5. Encounter for long-term (current) use of medications    6. Skin tear  -healing well.  Advised patient to leave the steristrips in place.  They will fall off on its own.  -keep area clean and dry.     Orders Placed This Encounter    TSH     Standing Status:   Future     Number of Occurrences:   1     Standing Expiration Date:   3/19/2025    T4, Free     Standing Status:   Future     Number of Occurrences:   1     Standing Expiration Date:   3/19/2025             Follow-up Disposition:   Return in about 6 months (around 9/19/2024) for Medicare AWV.                 Subjective:      Jennifer Madrid is a 89 y.o. female who presents today for follow up of her hypertension and hypothyroid    Since last visit :  -was seen in ER on 3/13/2024 for a fall.  She fell asleep sitting upright in a dining chair while watching television.  Has skin tear of her right hand. Xray of right hand done in ER shows no fracture. CT of neck done showing multilevel spondylosis without any acute abnormality, her head CT done showed no acute intracranial abnormality.      -taking levothyroxine 75mcg daily only 6 days per week.    -no longer driving.  Stopped a month ago.  Has 2 daughters who live in [place] who are able to assist with transportation.     -she lives independently in the former [address].  She reports that the new owners of [employer] are phasing out the 'old people'.

## 2024-03-19 NOTE — PROGRESS NOTES
Chief Complaint   Patient presents with    Follow-up     eviewed record in preparation for visit and have obtained necessary documentation.    Identified pt with two pt identifiers(name and ).      Health Maintenance Due   Topic    Shingles vaccine (1 of 2)    COVID-19 Vaccine ( season)    Annual Wellness Visit (Medicare)     DTaP/Tdap/Td vaccine (1 - Tdap)         Chief Complaint   Patient presents with    Follow-up        Wt Readings from Last 3 Encounters:   24 45.7 kg (100 lb 12.8 oz)   24 46.8 kg (103 lb 2.8 oz)   23 47.7 kg (105 lb 3.2 oz)     Temp Readings from Last 3 Encounters:   24 97.8 °F (36.6 °C) (Temporal)   24 98.2 °F (36.8 °C) (Oral)   23 98.2 °F (36.8 °C) (Temporal)     BP Readings from Last 3 Encounters:   24 (!) 140/70   24 (!) 150/67   23 130/65     Pulse Readings from Last 3 Encounters:   24 73   24 86   23 81           Learning Assessment:  :    Failed to redirect to the Timeline version of the CrossCurrent SmartLink.    Depression Screening:  :         No data to display                Fall Risk Assessment:  :    Failed to redirect to the Timeline version of the CrossCurrent SmartLink.    Abuse Screening:  :         No data to display                Coordination of Care Questionnaire:  :    1) Have you been to an emergency room, urgent care clinic since your last visit? no   Hospitalized since your last visit? no             2) Have you seen or consulted any other health care providers outside of [facility] since your last visit? no  (Include any pap smears or colon screenings in this section.)    3) Do you have an Advance Directive on file? no    4) Are you interested in receiving information on Advance Directives? No

## 2024-03-20 LAB
T4 FREE SERPL-MCNC: 1.4 NG/DL (ref 0.8–1.5)
TSH SERPL DL<=0.05 MIU/L-ACNC: 1.1 UIU/ML (ref 0.36–3.74)

## 2024-07-07 DIAGNOSIS — E03.9 HYPOTHYROIDISM, UNSPECIFIED: ICD-10-CM

## 2024-07-07 DIAGNOSIS — I10 ESSENTIAL (PRIMARY) HYPERTENSION: ICD-10-CM

## 2024-07-08 RX ORDER — LOSARTAN POTASSIUM 50 MG/1
TABLET ORAL
Qty: 90 TABLET | Refills: 0 | Status: SHIPPED | OUTPATIENT
Start: 2024-07-08

## 2024-07-08 RX ORDER — METOPROLOL SUCCINATE 25 MG/1
TABLET, EXTENDED RELEASE ORAL
Qty: 90 TABLET | Refills: 0 | Status: SHIPPED | OUTPATIENT
Start: 2024-07-08

## 2024-07-08 RX ORDER — LEVOTHYROXINE SODIUM 0.07 MG/1
TABLET ORAL
Qty: 90 TABLET | Refills: 0 | Status: SHIPPED | OUTPATIENT
Start: 2024-07-08

## 2024-07-08 NOTE — TELEPHONE ENCOUNTER
Rx sent to pharmacy as previously filled and verified by Verbal Order Read Back with provider.    NV 10/01/2024

## 2024-07-18 ENCOUNTER — OFFICE VISIT (OUTPATIENT)
Age: 89
End: 2024-07-18
Payer: MEDICARE

## 2024-07-18 VITALS
DIASTOLIC BLOOD PRESSURE: 69 MMHG | OXYGEN SATURATION: 96 % | RESPIRATION RATE: 16 BRPM | SYSTOLIC BLOOD PRESSURE: 137 MMHG | TEMPERATURE: 97.5 F | BODY MASS INDEX: 18.81 KG/M2 | HEIGHT: 60 IN | HEART RATE: 73 BPM | WEIGHT: 95.8 LBS

## 2024-07-18 DIAGNOSIS — R05.8 POST-VIRAL COUGH SYNDROME: Primary | ICD-10-CM

## 2024-07-18 PROCEDURE — 1090F PRES/ABSN URINE INCON ASSESS: CPT | Performed by: STUDENT IN AN ORGANIZED HEALTH CARE EDUCATION/TRAINING PROGRAM

## 2024-07-18 PROCEDURE — 1036F TOBACCO NON-USER: CPT | Performed by: STUDENT IN AN ORGANIZED HEALTH CARE EDUCATION/TRAINING PROGRAM

## 2024-07-18 PROCEDURE — 99213 OFFICE O/P EST LOW 20 MIN: CPT | Performed by: STUDENT IN AN ORGANIZED HEALTH CARE EDUCATION/TRAINING PROGRAM

## 2024-07-18 PROCEDURE — 1123F ACP DISCUSS/DSCN MKR DOCD: CPT | Performed by: STUDENT IN AN ORGANIZED HEALTH CARE EDUCATION/TRAINING PROGRAM

## 2024-07-18 PROCEDURE — G8420 CALC BMI NORM PARAMETERS: HCPCS | Performed by: STUDENT IN AN ORGANIZED HEALTH CARE EDUCATION/TRAINING PROGRAM

## 2024-07-18 PROCEDURE — G8427 DOCREV CUR MEDS BY ELIG CLIN: HCPCS | Performed by: STUDENT IN AN ORGANIZED HEALTH CARE EDUCATION/TRAINING PROGRAM

## 2024-07-18 ASSESSMENT — PATIENT HEALTH QUESTIONNAIRE - PHQ9
2. FEELING DOWN, DEPRESSED OR HOPELESS: NOT AT ALL
SUM OF ALL RESPONSES TO PHQ9 QUESTIONS 1 & 2: 0
1. LITTLE INTEREST OR PLEASURE IN DOING THINGS: NOT AT ALL
SUM OF ALL RESPONSES TO PHQ QUESTIONS 1-9: 0

## 2024-07-18 NOTE — PROGRESS NOTES
Jennifer Madrid (: 1925) is a 89 y.o. female patient here for evaluation of the following chief complaint(s):  Nasal Congestion (Started 3 weeks ago now /She has no energy to do any and she is back now eating at the begin of her sickness she wasn't eating ), Cough, and Congestion       Subjective:   Upper respiratory illness  - Reports that starting about three weeks ago she had a cold. Symptoms included cough, stuffy nose, fatigue  - She is finally starting to feel better, concerned that she lost 5lbs while she was sick but says that her appetite is coming back  - Her main complaint is continued cough and feeling like she is having sinus drainage.   - She has been using Mucinex recommended to her by the pharmacist but has not had any relief  - No fevers, chills, shortness of breath, chest pain, or other associated symptoms      Review of Systems   All other systems reviewed and are negative.        PMHx:  Patient Active Problem List   Diagnosis    Colon polyp    Elevated cholesterol    Arthritis of both knees    TB (tuberculosis), treated    Benign essential hypertension    Vitamin D deficiency    Acquired hypothyroidism    Chronic renal disease, stage III (HCC)       Prior to Admission medications    Medication Sig Start Date End Date Taking? Authorizing Provider   losartan (COZAAR) 50 MG tablet TAKE 1 TABLET BY MOUTH EVERY DAY 24  Yes Nicole Capone MD   metoprolol succinate (TOPROL XL) 25 MG extended release tablet TAKE 1 TABLET BY MOUTH EVERY DAY 24  Yes Nicole Capoen MD   levothyroxine (SYNTHROID) 75 MCG tablet TAKE 1 TABLET BY MOUTH EVERY MORNING BEFORE BREAKFAST 24  Yes Nicole Capone MD   Multiple Vitamins-Minerals (PRESERVISION AREDS PO) Take by mouth daily   Yes ProviderMichael MD   vitamin D (CHOLECALCIFEROL) 25 MCG (1000 UT) TABS tablet Take 2 tablets by mouth daily   Yes Automatic Reconciliation, Ar         Objective:     Vitals:    24 1606   BP: 137/69   Pulse:    Resp:    Temp:

## 2024-10-01 ENCOUNTER — OFFICE VISIT (OUTPATIENT)
Age: 89
End: 2024-10-01
Payer: MEDICARE

## 2024-10-01 VITALS
HEART RATE: 75 BPM | BODY MASS INDEX: 19.39 KG/M2 | WEIGHT: 98.8 LBS | DIASTOLIC BLOOD PRESSURE: 70 MMHG | SYSTOLIC BLOOD PRESSURE: 120 MMHG | TEMPERATURE: 97.5 F | RESPIRATION RATE: 16 BRPM | HEIGHT: 60 IN | OXYGEN SATURATION: 100 %

## 2024-10-01 DIAGNOSIS — Z23 NEEDS FLU SHOT: ICD-10-CM

## 2024-10-01 DIAGNOSIS — I10 ESSENTIAL (PRIMARY) HYPERTENSION: ICD-10-CM

## 2024-10-01 DIAGNOSIS — L98.9 SKIN LESION: ICD-10-CM

## 2024-10-01 DIAGNOSIS — Z00.00 MEDICARE ANNUAL WELLNESS VISIT, SUBSEQUENT: Primary | ICD-10-CM

## 2024-10-01 DIAGNOSIS — E03.9 HYPOTHYROIDISM, UNSPECIFIED TYPE: ICD-10-CM

## 2024-10-01 DIAGNOSIS — N18.30 STAGE 3 CHRONIC KIDNEY DISEASE, UNSPECIFIED WHETHER STAGE 3A OR 3B CKD (HCC): ICD-10-CM

## 2024-10-01 LAB
ALBUMIN SERPL-MCNC: 3.7 G/DL (ref 3.5–5)
ALBUMIN/GLOB SERPL: 1.1 (ref 1.1–2.2)
ALP SERPL-CCNC: 85 U/L (ref 45–117)
ALT SERPL-CCNC: 15 U/L (ref 12–78)
ANION GAP SERPL CALC-SCNC: 5 MMOL/L (ref 2–12)
AST SERPL-CCNC: 17 U/L (ref 15–37)
BASOPHILS # BLD: 0 K/UL (ref 0–0.1)
BASOPHILS NFR BLD: 0 % (ref 0–1)
BILIRUB SERPL-MCNC: 0.8 MG/DL (ref 0.2–1)
BUN SERPL-MCNC: 36 MG/DL (ref 6–20)
BUN/CREAT SERPL: 40 (ref 12–20)
CALCIUM SERPL-MCNC: 9.2 MG/DL (ref 8.5–10.1)
CHLORIDE SERPL-SCNC: 104 MMOL/L (ref 97–108)
CO2 SERPL-SCNC: 29 MMOL/L (ref 21–32)
CREAT SERPL-MCNC: 0.91 MG/DL (ref 0.55–1.02)
DIFFERENTIAL METHOD BLD: ABNORMAL
EOSINOPHIL # BLD: 0.1 K/UL (ref 0–0.4)
EOSINOPHIL NFR BLD: 2 % (ref 0–7)
ERYTHROCYTE [DISTWIDTH] IN BLOOD BY AUTOMATED COUNT: 14.6 % (ref 11.5–14.5)
GLOBULIN SER CALC-MCNC: 3.3 G/DL (ref 2–4)
GLUCOSE SERPL-MCNC: 90 MG/DL (ref 65–100)
HCT VFR BLD AUTO: 31.9 % (ref 35–47)
HGB BLD-MCNC: 10.2 G/DL (ref 11.5–16)
IMM GRANULOCYTES # BLD AUTO: 0 K/UL (ref 0–0.04)
IMM GRANULOCYTES NFR BLD AUTO: 0 % (ref 0–0.5)
LYMPHOCYTES # BLD: 2 K/UL (ref 0.8–3.5)
LYMPHOCYTES NFR BLD: 31 % (ref 12–49)
MCH RBC QN AUTO: 30 PG (ref 26–34)
MCHC RBC AUTO-ENTMCNC: 32 G/DL (ref 30–36.5)
MCV RBC AUTO: 93.8 FL (ref 80–99)
MONOCYTES # BLD: 1 K/UL (ref 0–1)
MONOCYTES NFR BLD: 16 % (ref 5–13)
NEUTS SEG # BLD: 3.2 K/UL (ref 1.8–8)
NEUTS SEG NFR BLD: 51 % (ref 32–75)
NRBC # BLD: 0 K/UL (ref 0–0.01)
NRBC BLD-RTO: 0 PER 100 WBC
PLATELET # BLD AUTO: 176 K/UL (ref 150–400)
PMV BLD AUTO: 10.5 FL (ref 8.9–12.9)
POTASSIUM SERPL-SCNC: 4.3 MMOL/L (ref 3.5–5.1)
PROT SERPL-MCNC: 7 G/DL (ref 6.4–8.2)
RBC # BLD AUTO: 3.4 M/UL (ref 3.8–5.2)
SODIUM SERPL-SCNC: 138 MMOL/L (ref 136–145)
T4 FREE SERPL-MCNC: 1.3 NG/DL (ref 0.8–1.5)
TSH SERPL DL<=0.05 MIU/L-ACNC: 0.52 UIU/ML (ref 0.36–3.74)
WBC # BLD AUTO: 6.3 K/UL (ref 3.6–11)

## 2024-10-01 PROCEDURE — 90653 IIV ADJUVANT VACCINE IM: CPT | Performed by: INTERNAL MEDICINE

## 2024-10-01 PROCEDURE — 99213 OFFICE O/P EST LOW 20 MIN: CPT | Performed by: INTERNAL MEDICINE

## 2024-10-01 SDOH — ECONOMIC STABILITY: INCOME INSECURITY: HOW HARD IS IT FOR YOU TO PAY FOR THE VERY BASICS LIKE FOOD, HOUSING, MEDICAL CARE, AND HEATING?: NOT HARD AT ALL

## 2024-10-01 SDOH — ECONOMIC STABILITY: FOOD INSECURITY: WITHIN THE PAST 12 MONTHS, THE FOOD YOU BOUGHT JUST DIDN'T LAST AND YOU DIDN'T HAVE MONEY TO GET MORE.: NEVER TRUE

## 2024-10-01 SDOH — ECONOMIC STABILITY: FOOD INSECURITY: WITHIN THE PAST 12 MONTHS, YOU WORRIED THAT YOUR FOOD WOULD RUN OUT BEFORE YOU GOT MONEY TO BUY MORE.: NEVER TRUE

## 2024-10-01 ASSESSMENT — LIFESTYLE VARIABLES
HOW MANY STANDARD DRINKS CONTAINING ALCOHOL DO YOU HAVE ON A TYPICAL DAY: PATIENT DOES NOT DRINK
HOW OFTEN DO YOU HAVE A DRINK CONTAINING ALCOHOL: NEVER

## 2024-10-01 ASSESSMENT — PATIENT HEALTH QUESTIONNAIRE - PHQ9
1. LITTLE INTEREST OR PLEASURE IN DOING THINGS: NOT AT ALL
SUM OF ALL RESPONSES TO PHQ QUESTIONS 1-9: 0
2. FEELING DOWN, DEPRESSED OR HOPELESS: NOT AT ALL
SUM OF ALL RESPONSES TO PHQ9 QUESTIONS 1 & 2: 0
SUM OF ALL RESPONSES TO PHQ QUESTIONS 1-9: 0

## 2024-10-01 NOTE — PATIENT INSTRUCTIONS
Call [phone] or search [employer] [place] [employer] online.  You need 2479-8200 mg of calcium and 7335-7640 IU of vitamin D per day. It is possible to meet your calcium requirement with diet alone, but a vitamin D supplement is usually necessary to meet this goal.  When exposed to the sun, use a sunscreen that protects against both UVA and UVB radiation with an SPF of 30 or greater. Reapply every 2 to 3 hours or after sweating, drying off with a towel, or swimming.  Always wear a seat belt when traveling in a car. Always wear a helmet when riding a bicycle or motorcycle.

## 2024-10-01 NOTE — PROGRESS NOTES
Chief Complaint   Patient presents with    Medicare AWV     eviewed record in preparation for visit and have obtained necessary documentation.    Identified pt with two pt identifiers(name and ).      Health Maintenance Due   Topic    Shingles vaccine (1 of 2)    DTaP/Tdap/Td vaccine (1 - Tdap)    Flu vaccine (1)    COVID-19 Vaccine ( season)         Chief Complaint   Patient presents with    Medicare AWV        Wt Readings from Last 3 Encounters:   10/01/24 44.8 kg (98 lb 12.8 oz)   24 43.5 kg (95 lb 12.8 oz)   24 45.7 kg (100 lb 12.8 oz)     Temp Readings from Last 3 Encounters:   10/01/24 97.5 °F (36.4 °C) (Temporal)   24 97.5 °F (36.4 °C) (Temporal)   24 97.8 °F (36.6 °C) (Temporal)     BP Readings from Last 3 Encounters:   10/01/24 120/70   24 137/69   24 (!) 140/70     Pulse Readings from Last 3 Encounters:   10/01/24 75   24 73   24 73           Learning Assessment:  :    Failed to redirect to the Timeline version of the REVFS SmartLink.    Depression Screening:  :         No data to display                Fall Risk Assessment:  :    Failed to redirect to the Timeline version of the REVFS SmartLink.    Abuse Screening:  :         No data to display

## 2024-10-01 NOTE — PROGRESS NOTES
Medicare Annual Wellness Visit    Jennifer Madrid is here for Medicare AWV    Assessment & Plan   1. Medicare annual wellness visit, subsequent  -does not have ACP.  She reports that her family is aware of her wishes.  Declined more information.  -[facility] decision maker reviewed with patient and updated.      2. Essential (primary) hypertension  -controlled with use of losartan 50mg daily and toprol XL 25mg daily.     - Comprehensive Metabolic Panel; Future  - CBC with Auto Differential; Future    3. Stage 3 chronic kidney disease, unspecified whether stage 3a or 3b CKD (HCC)  -labs today for renal function    4. Skin lesion  -left ear.  Recommended consultation with dermatology. Concerned for basal cell.     - [facility] - Goldie Hand MD, [facility] ([facility])    5. Hypothyroidism, unspecified type  -taking levothyroxine 75mcg daily.     - TSH; Future  - T4, Free; Future    6. Needs flu shot  - Patient received flu vaccine today without any complications.     Orders Placed This Encounter    Influenza, FLUAD Trivalent, (age 65 y+), IM, Preservative Free, 0.5mL    Comprehensive Metabolic Panel     Standing Status:   Future     Number of Occurrences:   1     Standing Expiration Date:   10/1/2025    CBC with Auto Differential     Standing Status:   Future     Number of Occurrences:   1     Standing Expiration Date:   10/1/2025    TSH     Standing Status:   Future     Number of Occurrences:   1     Standing Expiration Date:   10/1/2025    T4, Free     Standing Status:   Future     Number of Occurrences:   1     Standing Expiration Date:   10/1/2025    [facility] - Goldie Hand MD, [facility] ([facility])     Referral Priority:   Routine     Referral Type:   Eval and Treat     Referral Reason:   Specialty Services Required     Referred to Provider:   Goldie Hand MD     Requested Specialty:   Dermatology     Number of Visits Requested:   1       Recommendations for

## 2024-10-03 DIAGNOSIS — E03.9 HYPOTHYROIDISM, UNSPECIFIED: ICD-10-CM

## 2024-10-03 DIAGNOSIS — I10 ESSENTIAL (PRIMARY) HYPERTENSION: ICD-10-CM

## 2024-10-03 RX ORDER — METOPROLOL SUCCINATE 25 MG/1
25 TABLET, EXTENDED RELEASE ORAL DAILY
Qty: 90 TABLET | Refills: 1 | Status: SHIPPED | OUTPATIENT
Start: 2024-10-03

## 2024-10-03 RX ORDER — LEVOTHYROXINE SODIUM 75 UG/1
75 TABLET ORAL
Qty: 90 TABLET | Refills: 1 | Status: SHIPPED | OUTPATIENT
Start: 2024-10-03

## 2024-10-03 RX ORDER — LOSARTAN POTASSIUM 50 MG/1
50 TABLET ORAL DAILY
Qty: 90 TABLET | Refills: 1 | Status: SHIPPED | OUTPATIENT
Start: 2024-10-03

## 2024-11-26 NOTE — PROGRESS NOTES
Called pt and notified him of results/orders and he v/u.  Subjective:      Odin Boyle is a 80 y.o. female who presents today for follow up of her hypertension and hypothyroid. She is also here for her Medicare Wellness exam.     Has trouble with arthritis pain periodically, but not enough to have to use medications. She is quite independent very active. [facility] Still [facility]. Patient Active Problem List   Diagnosis Code    Colon polyp K63.5    Elevated cholesterol E78.00    Benign essential hypertension I10    Acquired hypothyroidism E03.9    Arthritis of both knees M17.0    TB (tuberculosis), treated A15.9    Vitamin D deficiency E55.9     Current Outpatient Medications   Medication Sig Dispense Refill    losartan (COZAAR) 25 mg tablet TAKE ONE TABLET EVERY DAY 90 Tab 1    levothyroxine (SYNTHROID) 75 mcg tablet Take 1 tablet 6 days per week. 90 Tab 1    cholecalciferol (VITAMIN D3) 1,000 unit tablet Take 2,000 Units by mouth daily. Review of Systems    Pertinent items are noted in HPI. Objective:     Visit Vitals  /60 (BP 1 Location: Left arm, BP Patient Position: Sitting)   Pulse 86   Temp 98.2 °F (36.8 °C) (Oral)   Resp 16   Ht 5' 1\" (1.549 m) Comment: patient reported   Wt 123 lb (55.8 kg)   SpO2 96%   BMI 23.24 kg/m²     General appearance: alert, cooperative, no distress, appears stated age  Head: Normocephalic, without obvious abnormality, atraumatic  Neck: supple, symmetrical, trachea midline, no adenopathy, thyroid: not enlarged, symmetric, no tenderness/mass/nodules, no carotid bruit and no JVD  Lungs: clear to auscultation bilaterally  Heart: regular rate and rhythm, S1, S2 normal, no murmur, click, rub or gallop  Abdomen: soft, non-tender. Bowel sounds normal. No masses,  no organomegaly  Extremities: no edema    Assessment/Plan:     1. Medicare annual wellness visit, subsequent  -patient will get her flu shot later this fall with [facility].   - [place] ANNUAL ALCOHOL SCREEN 15 MIN  - DEPRESSION SCREEN ANNUAL    2. Screening for alcoholism    - [place] ANNUAL ALCOHOL SCREEN 15 MIN    3. Screening for depression    - DEPRESSION SCREEN ANNUAL    4. Benign essential hypertension  -I evaluated and recommended to continue current doses of medications. - losartan (COZAAR) 25 mg tablet; TAKE ONE TABLET EVERY DAY  Dispense: 90 Tab; Refill: 1  - METABOLIC PANEL, COMPREHENSIVE    5. Acquired hypothyroidism  -clinically euthyroid. - levothyroxine (SYNTHROID) 75 mcg tablet; Take 1 tablet 6 days per week. Dispense: 90 Tab; Refill: 1  - TSH 3RD GENERATION  - T4, FREE    6. Encounter for long-term (current) use of medications    - METABOLIC PANEL, COMPREHENSIVE  - TSH 3RD GENERATION  - T4, FREE     Follow-up Disposition:     Follow up in 6 months     Return if symptoms worsen or fail to improve. Advised patient to call back or return to office if symptoms worsen/change/persist.     Discussed expected course/resolution/complications of diagnosis in detail with patient. Medication risks/benefits/costs/interactions/alternatives discussed with patient. Patient was given an after visit summary which includes diagnoses, current medications, & vitals. Patient expressed understanding with the diagnosis and plan. This is the Subsequent Medicare Annual Wellness Exam, performed 12 months or more after the Initial AWV or the last Subsequent AWV    I have reviewed the patient's medical history in detail and updated the computerized patient record.      History     Past Medical History:   Diagnosis Date    Hypercholesterolemia     Hypertension     Thyroid disease     hypothyroid      Past Surgical History:   Procedure Laterality Date    ENDOSCOPY, COLON, DIAGNOSTIC  2001    HX BACK SURGERY  1984    HX BREAST BIOPSY  2010    duct removal, benign    HX DILATION AND CURETTAGE      HX HYSTERECTOMY  1980 +BSO    HX OPEN REDUCTION INTERNAL FIXATION  1996    L forearm    VASCULAR SURGERY PROCEDURE UNLIST  1960s    vein stripping-bilat     Current Outpatient Medications   Medication Sig Dispense Refill    losartan (COZAAR) 25 mg tablet TAKE ONE TABLET EVERY DAY 90 Tab 1    levothyroxine (SYNTHROID) 75 mcg tablet Take 1 tablet 6 days per week. 90 Tab 1    cholecalciferol (VITAMIN D3) 1,000 unit tablet Take 2,000 Units by mouth daily. Allergies   Allergen Reactions    Morphine Nausea and Vomiting     Did not relieve pain and couldn't sleep and caused vomiting      Cortisone Other (comments)     Injection-she had trouble walking after injection - worsening of movement     Family History   Problem Relation Age of Onset    Cancer Father         liver    Colon Cancer Mother     Cancer Mother         colon    Cancer Sister         liver    Cancer Brother         liver    Colon Polyps Sister     Thyroid Cancer Daughter      Social History     Tobacco Use    Smoking status: Never Smoker    Smokeless tobacco: Never Used   Substance Use Topics    Alcohol use: No     Patient Active Problem List   Diagnosis Code    Colon polyp K63.5    Elevated cholesterol E78.00    Benign essential hypertension I10    Acquired hypothyroidism E03.9    Arthritis of both knees M17.0    TB (tuberculosis), treated A15.9    Vitamin D deficiency E55.9       Depression Risk Factor Screening:     3 most recent PHQ Screens 5/9/2019   Little interest or pleasure in doing things Not at all   Feeling down, depressed, irritable, or hopeless Not at all   Total Score PHQ 2 0     Alcohol Risk Factor Screening: You do not drink alcohol or very rarely. Functional Ability and Level of Safety:   Hearing Loss  Hearing is good. Activities of Daily Living  The home contains: handrails and grab bars  Patient does total self care    Fall Risk  Fall Risk Assessment, last 12 mths 5/9/2019   Able to walk? Yes   Fall in past 12 months?  No   Fall with injury? -   Number of falls in past 12 months -   Fall Risk Score -       Abuse Screen  Patient is not abused    Cognitive Screening   Evaluation of Cognitive Function:  Has your family/caregiver stated any concerns about your memory: no  Normal    Patient Care Team   Patient Care Team:  Tahira Johnston MD as PCP - General (Internal Medicine)  Mickey Mayorga MD as Physician (Orthopedic Surgery)  Veronica Fox MD as Physician (Ophthalmology)    Assessment/Plan   Education and counseling provided:  Are appropriate based on today's review and evaluation    Diagnoses and all orders for this visit:    1. Medicare annual wellness visit, subsequent  -     [place] ANNUAL ALCOHOL SCREEN [address]    2.  Screening for alcoholism  -     ME ANNUAL ALCOHOL SCREEN 15 MIN    3. Screening for depression  -     [place] [id number]

## 2025-04-03 DIAGNOSIS — E03.9 HYPOTHYROIDISM, UNSPECIFIED: ICD-10-CM

## 2025-04-03 DIAGNOSIS — I10 ESSENTIAL (PRIMARY) HYPERTENSION: ICD-10-CM

## 2025-04-03 RX ORDER — LEVOTHYROXINE SODIUM 75 UG/1
TABLET ORAL
Qty: 90 TABLET | Refills: 1 | Status: SHIPPED | OUTPATIENT
Start: 2025-04-03

## 2025-04-03 RX ORDER — METOPROLOL SUCCINATE 25 MG/1
25 TABLET, EXTENDED RELEASE ORAL DAILY
Qty: 90 TABLET | Refills: 1 | Status: SHIPPED | OUTPATIENT
Start: 2025-04-03

## 2025-04-03 RX ORDER — LOSARTAN POTASSIUM 50 MG/1
50 TABLET ORAL DAILY
Qty: 90 TABLET | Refills: 1 | Status: SHIPPED | OUTPATIENT
Start: 2025-04-03

## 2025-04-03 NOTE — TELEPHONE ENCOUNTER
Last office visit 10/1/24  Next Appt  With Internal Medicine (Nicole Capone MD)  04/10/2025 at 1:00 PM    Last fill on metorprolol 10/3/24 #90 with 1 additional refills   Last fill on losartan 10/3/24 #90 with 1 additional refills   Last fill on synthroid 10/3/24 #90 with 1 additional refills

## 2025-04-09 NOTE — PROGRESS NOTES
other individuals in attendance at the appointment consented to the use of AI, including the recording.

## 2025-04-10 ENCOUNTER — OFFICE VISIT (OUTPATIENT)
Age: 89
End: 2025-04-10

## 2025-04-10 VITALS
BODY MASS INDEX: 18.22 KG/M2 | RESPIRATION RATE: 16 BRPM | WEIGHT: 92.8 LBS | OXYGEN SATURATION: 99 % | SYSTOLIC BLOOD PRESSURE: 163 MMHG | DIASTOLIC BLOOD PRESSURE: 100 MMHG | TEMPERATURE: 97.8 F | HEART RATE: 70 BPM | HEIGHT: 60 IN

## 2025-04-10 DIAGNOSIS — I10 ESSENTIAL (PRIMARY) HYPERTENSION: ICD-10-CM

## 2025-04-10 DIAGNOSIS — G89.29 CHRONIC LEFT-SIDED THORACIC BACK PAIN: ICD-10-CM

## 2025-04-10 DIAGNOSIS — Z79.899 ENCOUNTER FOR LONG-TERM (CURRENT) USE OF MEDICATIONS: ICD-10-CM

## 2025-04-10 DIAGNOSIS — J30.0 VASOMOTOR RHINITIS: ICD-10-CM

## 2025-04-10 DIAGNOSIS — E03.9 HYPOTHYROIDISM, UNSPECIFIED TYPE: ICD-10-CM

## 2025-04-10 DIAGNOSIS — I10 ESSENTIAL (PRIMARY) HYPERTENSION: Primary | ICD-10-CM

## 2025-04-10 DIAGNOSIS — N18.30 STAGE 3 CHRONIC KIDNEY DISEASE, UNSPECIFIED WHETHER STAGE 3A OR 3B CKD (HCC): ICD-10-CM

## 2025-04-10 DIAGNOSIS — M54.6 CHRONIC LEFT-SIDED THORACIC BACK PAIN: ICD-10-CM

## 2025-04-10 DIAGNOSIS — I49.9 IRREGULAR HEART BEATS: ICD-10-CM

## 2025-04-10 PROCEDURE — 99215 OFFICE O/P EST HI 40 MIN: CPT | Performed by: INTERNAL MEDICINE

## 2025-04-10 PROCEDURE — 93005 ELECTROCARDIOGRAM TRACING: CPT | Performed by: INTERNAL MEDICINE

## 2025-04-10 RX ORDER — IPRATROPIUM BROMIDE 42 UG/1
2 SPRAY, METERED NASAL 4 TIMES DAILY
Qty: 15 ML | Refills: 3 | Status: SHIPPED | OUTPATIENT
Start: 2025-04-10

## 2025-04-10 SDOH — ECONOMIC STABILITY: FOOD INSECURITY: WITHIN THE PAST 12 MONTHS, YOU WORRIED THAT YOUR FOOD WOULD RUN OUT BEFORE YOU GOT MONEY TO BUY MORE.: NEVER TRUE

## 2025-04-10 SDOH — ECONOMIC STABILITY: FOOD INSECURITY: WITHIN THE PAST 12 MONTHS, THE FOOD YOU BOUGHT JUST DIDN'T LAST AND YOU DIDN'T HAVE MONEY TO GET MORE.: NEVER TRUE

## 2025-04-10 ASSESSMENT — PATIENT HEALTH QUESTIONNAIRE - PHQ9
SUM OF ALL RESPONSES TO PHQ QUESTIONS 1-9: 0
SUM OF ALL RESPONSES TO PHQ QUESTIONS 1-9: 0
1. LITTLE INTEREST OR PLEASURE IN DOING THINGS: NOT AT ALL
SUM OF ALL RESPONSES TO PHQ QUESTIONS 1-9: 0
2. FEELING DOWN, DEPRESSED OR HOPELESS: NOT AT ALL
SUM OF ALL RESPONSES TO PHQ QUESTIONS 1-9: 0

## 2025-04-11 LAB
ALBUMIN SERPL-MCNC: 3.7 G/DL (ref 3.5–5)
ALBUMIN/GLOB SERPL: 0.9 (ref 1.1–2.2)
ALP SERPL-CCNC: 93 U/L (ref 45–117)
ALT SERPL-CCNC: 18 U/L (ref 12–78)
ANION GAP SERPL CALC-SCNC: 5 MMOL/L (ref 2–12)
AST SERPL-CCNC: 22 U/L (ref 15–37)
BASOPHILS # BLD: 0.04 K/UL (ref 0–0.1)
BASOPHILS NFR BLD: 0.5 % (ref 0–1)
BILIRUB SERPL-MCNC: 0.7 MG/DL (ref 0.2–1)
BUN SERPL-MCNC: 23 MG/DL (ref 6–20)
BUN/CREAT SERPL: 26 (ref 12–20)
CALCIUM SERPL-MCNC: 9.3 MG/DL (ref 8.5–10.1)
CHLORIDE SERPL-SCNC: 102 MMOL/L (ref 97–108)
CO2 SERPL-SCNC: 31 MMOL/L (ref 21–32)
CREAT SERPL-MCNC: 0.88 MG/DL (ref 0.55–1.02)
DIFFERENTIAL METHOD BLD: ABNORMAL
EOSINOPHIL # BLD: 0.08 K/UL (ref 0–0.4)
EOSINOPHIL NFR BLD: 1 % (ref 0–7)
ERYTHROCYTE [DISTWIDTH] IN BLOOD BY AUTOMATED COUNT: 14.8 % (ref 11.5–14.5)
GLOBULIN SER CALC-MCNC: 3.9 G/DL (ref 2–4)
GLUCOSE SERPL-MCNC: 93 MG/DL (ref 65–100)
HCT VFR BLD AUTO: 33.9 % (ref 35–47)
HGB BLD-MCNC: 10.6 G/DL (ref 11.5–16)
IMM GRANULOCYTES # BLD AUTO: 0.02 K/UL (ref 0–0.04)
IMM GRANULOCYTES NFR BLD AUTO: 0.2 % (ref 0–0.5)
LYMPHOCYTES # BLD: 1.61 K/UL (ref 0.8–3.5)
LYMPHOCYTES NFR BLD: 19.7 % (ref 12–49)
MCH RBC QN AUTO: 28.5 PG (ref 26–34)
MCHC RBC AUTO-ENTMCNC: 31.3 G/DL (ref 30–36.5)
MCV RBC AUTO: 91.1 FL (ref 80–99)
MONOCYTES # BLD: 1.18 K/UL (ref 0–1)
MONOCYTES NFR BLD: 14.4 % (ref 5–13)
NEUTS SEG # BLD: 5.24 K/UL (ref 1.8–8)
NEUTS SEG NFR BLD: 64.2 % (ref 32–75)
NRBC # BLD: 0 K/UL (ref 0–0.01)
NRBC BLD-RTO: 0 PER 100 WBC
PLATELET # BLD AUTO: 199 K/UL (ref 150–400)
PMV BLD AUTO: 10.6 FL (ref 8.9–12.9)
POTASSIUM SERPL-SCNC: 4.2 MMOL/L (ref 3.5–5.1)
PROT SERPL-MCNC: 7.6 G/DL (ref 6.4–8.2)
RBC # BLD AUTO: 3.72 M/UL (ref 3.8–5.2)
SODIUM SERPL-SCNC: 138 MMOL/L (ref 136–145)
T4 FREE SERPL-MCNC: 1.6 NG/DL (ref 0.8–1.5)
TSH SERPL DL<=0.05 MIU/L-ACNC: 0.59 UIU/ML (ref 0.36–3.74)
WBC # BLD AUTO: 8.2 K/UL (ref 3.6–11)

## 2025-04-13 ENCOUNTER — RESULTS FOLLOW-UP (OUTPATIENT)
Age: 89
End: 2025-04-13

## 2025-08-26 ENCOUNTER — OFFICE VISIT (OUTPATIENT)
Age: 89
End: 2025-08-26
Payer: MEDICARE

## 2025-08-26 VITALS
SYSTOLIC BLOOD PRESSURE: 140 MMHG | HEART RATE: 87 BPM | OXYGEN SATURATION: 98 % | HEIGHT: 60 IN | TEMPERATURE: 97.9 F | RESPIRATION RATE: 16 BRPM | WEIGHT: 91.8 LBS | DIASTOLIC BLOOD PRESSURE: 70 MMHG | BODY MASS INDEX: 18.02 KG/M2

## 2025-08-26 DIAGNOSIS — J30.0 VASOMOTOR RHINITIS: ICD-10-CM

## 2025-08-26 DIAGNOSIS — R31.9 HEMATURIA, UNSPECIFIED TYPE: Primary | ICD-10-CM

## 2025-08-26 DIAGNOSIS — R31.9 HEMATURIA, UNSPECIFIED TYPE: ICD-10-CM

## 2025-08-26 LAB
APPEARANCE UR: CLEAR
BACTERIA URNS QL MICRO: ABNORMAL /HPF
BILIRUB UR QL: NEGATIVE
COLOR UR: ABNORMAL
EPITH CASTS URNS QL MICRO: ABNORMAL /LPF
GLUCOSE UR STRIP.AUTO-MCNC: NEGATIVE MG/DL
HGB UR QL STRIP: ABNORMAL
HYALINE CASTS URNS QL MICRO: ABNORMAL /LPF (ref 0–5)
KETONES UR QL STRIP.AUTO: NEGATIVE MG/DL
LEUKOCYTE ESTERASE UR QL STRIP.AUTO: ABNORMAL
NITRITE UR QL STRIP.AUTO: NEGATIVE
PH UR STRIP: 6.5 (ref 5–8)
PROT UR STRIP-MCNC: NEGATIVE MG/DL
RBC #/AREA URNS HPF: ABNORMAL /HPF (ref 0–5)
SP GR UR REFRACTOMETRY: 1 (ref 1–1.03)
UROBILINOGEN UR QL STRIP.AUTO: 0.2 EU/DL (ref 0.2–1)
WBC URNS QL MICRO: >100 /HPF (ref 0–4)

## 2025-08-26 PROCEDURE — 1159F MED LIST DOCD IN RCRD: CPT | Performed by: INTERNAL MEDICINE

## 2025-08-26 PROCEDURE — G8419 CALC BMI OUT NRM PARAM NOF/U: HCPCS | Performed by: INTERNAL MEDICINE

## 2025-08-26 PROCEDURE — 1126F AMNT PAIN NOTED NONE PRSNT: CPT | Performed by: INTERNAL MEDICINE

## 2025-08-26 PROCEDURE — 99213 OFFICE O/P EST LOW 20 MIN: CPT | Performed by: INTERNAL MEDICINE

## 2025-08-26 PROCEDURE — 1123F ACP DISCUSS/DSCN MKR DOCD: CPT | Performed by: INTERNAL MEDICINE

## 2025-08-26 PROCEDURE — 1036F TOBACCO NON-USER: CPT | Performed by: INTERNAL MEDICINE

## 2025-08-26 PROCEDURE — 1090F PRES/ABSN URINE INCON ASSESS: CPT | Performed by: INTERNAL MEDICINE

## 2025-08-26 PROCEDURE — G8427 DOCREV CUR MEDS BY ELIG CLIN: HCPCS | Performed by: INTERNAL MEDICINE

## 2025-08-27 ENCOUNTER — RESULTS FOLLOW-UP (OUTPATIENT)
Age: 89
End: 2025-08-27

## 2025-08-28 LAB
BACTERIA SPEC CULT: ABNORMAL
CC UR VC: ABNORMAL
SERVICE CMNT-IMP: ABNORMAL

## 2025-09-03 RX ORDER — NITROFURANTOIN 25; 75 MG/1; MG/1
100 CAPSULE ORAL 2 TIMES DAILY
Qty: 10 CAPSULE | Refills: 0 | Status: SHIPPED | OUTPATIENT
Start: 2025-09-03 | End: 2025-09-08